# Patient Record
Sex: MALE | Race: OTHER | NOT HISPANIC OR LATINO | URBAN - METROPOLITAN AREA
[De-identification: names, ages, dates, MRNs, and addresses within clinical notes are randomized per-mention and may not be internally consistent; named-entity substitution may affect disease eponyms.]

---

## 2024-04-22 ENCOUNTER — INPATIENT (INPATIENT)
Facility: HOSPITAL | Age: 79
LOS: 13 days | Discharge: HOME HEALTH | DRG: 330 | End: 2024-05-06
Attending: SURGERY | Admitting: STUDENT IN AN ORGANIZED HEALTH CARE EDUCATION/TRAINING PROGRAM
Payer: SELF-PAY

## 2024-04-22 VITALS
RESPIRATION RATE: 18 BRPM | WEIGHT: 179.9 LBS | SYSTOLIC BLOOD PRESSURE: 181 MMHG | OXYGEN SATURATION: 98 % | DIASTOLIC BLOOD PRESSURE: 83 MMHG | TEMPERATURE: 98 F | HEART RATE: 81 BPM | HEIGHT: 64 IN

## 2024-04-22 DIAGNOSIS — K62.5 HEMORRHAGE OF ANUS AND RECTUM: ICD-10-CM

## 2024-04-22 LAB
ALBUMIN SERPL ELPH-MCNC: 4.3 G/DL — SIGNIFICANT CHANGE UP (ref 3.3–5)
ALP SERPL-CCNC: 78 U/L — SIGNIFICANT CHANGE UP (ref 40–120)
ALT FLD-CCNC: 34 U/L — SIGNIFICANT CHANGE UP (ref 10–45)
ANION GAP SERPL CALC-SCNC: 13 MMOL/L — SIGNIFICANT CHANGE UP (ref 5–17)
APPEARANCE UR: CLEAR — SIGNIFICANT CHANGE UP
APTT BLD: 29.7 SEC — SIGNIFICANT CHANGE UP (ref 24.5–35.6)
AST SERPL-CCNC: 50 U/L — HIGH (ref 10–40)
BASE EXCESS BLDV CALC-SCNC: -1.2 MMOL/L — SIGNIFICANT CHANGE UP (ref -2–3)
BASOPHILS # BLD AUTO: 0.03 K/UL — SIGNIFICANT CHANGE UP (ref 0–0.2)
BASOPHILS NFR BLD AUTO: 0.3 % — SIGNIFICANT CHANGE UP (ref 0–2)
BILIRUB SERPL-MCNC: 0.2 MG/DL — SIGNIFICANT CHANGE UP (ref 0.2–1.2)
BILIRUB UR-MCNC: NEGATIVE — SIGNIFICANT CHANGE UP
BUN SERPL-MCNC: 19 MG/DL — SIGNIFICANT CHANGE UP (ref 7–23)
CA-I SERPL-SCNC: 1.22 MMOL/L — SIGNIFICANT CHANGE UP (ref 1.15–1.33)
CALCIUM SERPL-MCNC: 9.3 MG/DL — SIGNIFICANT CHANGE UP (ref 8.4–10.5)
CHLORIDE BLDV-SCNC: 106 MMOL/L — SIGNIFICANT CHANGE UP (ref 96–108)
CHLORIDE SERPL-SCNC: 107 MMOL/L — SIGNIFICANT CHANGE UP (ref 96–108)
CO2 BLDV-SCNC: 26 MMOL/L — SIGNIFICANT CHANGE UP (ref 22–26)
CO2 SERPL-SCNC: 22 MMOL/L — SIGNIFICANT CHANGE UP (ref 22–31)
COLOR SPEC: YELLOW — SIGNIFICANT CHANGE UP
CREAT SERPL-MCNC: 0.87 MG/DL — SIGNIFICANT CHANGE UP (ref 0.5–1.3)
DIFF PNL FLD: NEGATIVE — SIGNIFICANT CHANGE UP
EGFR: 88 ML/MIN/1.73M2 — SIGNIFICANT CHANGE UP
EOSINOPHIL # BLD AUTO: 0.19 K/UL — SIGNIFICANT CHANGE UP (ref 0–0.5)
EOSINOPHIL NFR BLD AUTO: 2 % — SIGNIFICANT CHANGE UP (ref 0–6)
GAS PNL BLDV: 139 MMOL/L — SIGNIFICANT CHANGE UP (ref 136–145)
GAS PNL BLDV: SIGNIFICANT CHANGE UP
GLUCOSE BLDV-MCNC: 113 MG/DL — HIGH (ref 70–99)
GLUCOSE SERPL-MCNC: 102 MG/DL — HIGH (ref 70–99)
GLUCOSE UR QL: NEGATIVE MG/DL — SIGNIFICANT CHANGE UP
HCO3 BLDV-SCNC: 25 MMOL/L — SIGNIFICANT CHANGE UP (ref 22–29)
HCT VFR BLD CALC: 34.1 % — LOW (ref 39–50)
HCT VFR BLD CALC: 34.6 % — LOW (ref 39–50)
HCT VFR BLDA CALC: 32 % — LOW (ref 39–51)
HGB BLD CALC-MCNC: 10.8 G/DL — LOW (ref 12.6–17.4)
HGB BLD-MCNC: 10.6 G/DL — LOW (ref 13–17)
HGB BLD-MCNC: 10.7 G/DL — LOW (ref 13–17)
IMM GRANULOCYTES NFR BLD AUTO: 0.4 % — SIGNIFICANT CHANGE UP (ref 0–0.9)
INR BLD: 1.01 RATIO — SIGNIFICANT CHANGE UP (ref 0.85–1.18)
KETONES UR-MCNC: ABNORMAL MG/DL
LACTATE BLDV-MCNC: 1.2 MMOL/L — SIGNIFICANT CHANGE UP (ref 0.5–2)
LEUKOCYTE ESTERASE UR-ACNC: NEGATIVE — SIGNIFICANT CHANGE UP
LIDOCAIN IGE QN: 54 U/L — SIGNIFICANT CHANGE UP (ref 7–60)
LYMPHOCYTES # BLD AUTO: 1.8 K/UL — SIGNIFICANT CHANGE UP (ref 1–3.3)
LYMPHOCYTES # BLD AUTO: 19.2 % — SIGNIFICANT CHANGE UP (ref 13–44)
MAGNESIUM SERPL-MCNC: 2 MG/DL — SIGNIFICANT CHANGE UP (ref 1.6–2.6)
MCHC RBC-ENTMCNC: 26.2 PG — LOW (ref 27–34)
MCHC RBC-ENTMCNC: 26.4 PG — LOW (ref 27–34)
MCHC RBC-ENTMCNC: 30.9 GM/DL — LOW (ref 32–36)
MCHC RBC-ENTMCNC: 31.1 GM/DL — LOW (ref 32–36)
MCV RBC AUTO: 84.4 FL — SIGNIFICANT CHANGE UP (ref 80–100)
MCV RBC AUTO: 85.4 FL — SIGNIFICANT CHANGE UP (ref 80–100)
MONOCYTES # BLD AUTO: 0.72 K/UL — SIGNIFICANT CHANGE UP (ref 0–0.9)
MONOCYTES NFR BLD AUTO: 7.7 % — SIGNIFICANT CHANGE UP (ref 2–14)
NEUTROPHILS # BLD AUTO: 6.6 K/UL — SIGNIFICANT CHANGE UP (ref 1.8–7.4)
NEUTROPHILS NFR BLD AUTO: 70.4 % — SIGNIFICANT CHANGE UP (ref 43–77)
NITRITE UR-MCNC: NEGATIVE — SIGNIFICANT CHANGE UP
NRBC # BLD: 0 /100 WBCS — SIGNIFICANT CHANGE UP (ref 0–0)
NRBC # BLD: 0 /100 WBCS — SIGNIFICANT CHANGE UP (ref 0–0)
OB PNL STL: NEGATIVE — SIGNIFICANT CHANGE UP
PCO2 BLDV: 46 MMHG — SIGNIFICANT CHANGE UP (ref 42–55)
PH BLDV: 7.34 — SIGNIFICANT CHANGE UP (ref 7.32–7.43)
PH UR: 5 — SIGNIFICANT CHANGE UP (ref 5–8)
PLATELET # BLD AUTO: 283 K/UL — SIGNIFICANT CHANGE UP (ref 150–400)
PLATELET # BLD AUTO: 298 K/UL — SIGNIFICANT CHANGE UP (ref 150–400)
PO2 BLDV: 19 MMHG — LOW (ref 25–45)
POTASSIUM BLDV-SCNC: 4.1 MMOL/L — SIGNIFICANT CHANGE UP (ref 3.5–5.1)
POTASSIUM SERPL-MCNC: 4.2 MMOL/L — SIGNIFICANT CHANGE UP (ref 3.5–5.3)
POTASSIUM SERPL-SCNC: 4.2 MMOL/L — SIGNIFICANT CHANGE UP (ref 3.5–5.3)
PROT SERPL-MCNC: 7.5 G/DL — SIGNIFICANT CHANGE UP (ref 6–8.3)
PROT UR-MCNC: NEGATIVE MG/DL — SIGNIFICANT CHANGE UP
PROTHROM AB SERPL-ACNC: 10.6 SEC — SIGNIFICANT CHANGE UP (ref 9.5–13)
RBC # BLD: 4.04 M/UL — LOW (ref 4.2–5.8)
RBC # BLD: 4.05 M/UL — LOW (ref 4.2–5.8)
RBC # FLD: 13.8 % — SIGNIFICANT CHANGE UP (ref 10.3–14.5)
RBC # FLD: 13.9 % — SIGNIFICANT CHANGE UP (ref 10.3–14.5)
SAO2 % BLDV: 24 % — LOW (ref 67–88)
SODIUM SERPL-SCNC: 142 MMOL/L — SIGNIFICANT CHANGE UP (ref 135–145)
SP GR SPEC: 1.02 — SIGNIFICANT CHANGE UP (ref 1–1.03)
UROBILINOGEN FLD QL: 0.2 MG/DL — SIGNIFICANT CHANGE UP (ref 0.2–1)
WBC # BLD: 9.38 K/UL — SIGNIFICANT CHANGE UP (ref 3.8–10.5)
WBC # BLD: 9.82 K/UL — SIGNIFICANT CHANGE UP (ref 3.8–10.5)
WBC # FLD AUTO: 9.38 K/UL — SIGNIFICANT CHANGE UP (ref 3.8–10.5)
WBC # FLD AUTO: 9.82 K/UL — SIGNIFICANT CHANGE UP (ref 3.8–10.5)

## 2024-04-22 PROCEDURE — 99285 EMERGENCY DEPT VISIT HI MDM: CPT

## 2024-04-22 PROCEDURE — 74177 CT ABD & PELVIS W/CONTRAST: CPT | Mod: 26,MC

## 2024-04-22 PROCEDURE — 71045 X-RAY EXAM CHEST 1 VIEW: CPT | Mod: 26

## 2024-04-22 PROCEDURE — 99223 1ST HOSP IP/OBS HIGH 75: CPT

## 2024-04-22 NOTE — ED PROVIDER NOTE - CONSIDERATION OF ADMISSION OBSERVATION
Patient with no out patient follow up. Recent moved to this area making discharge less safe Consideration of Admission/Observation

## 2024-04-22 NOTE — ED PROVIDER NOTE - OBJECTIVE STATEMENT
79-year-old male with a history of prostate cancer, HTN, T2DM presenting with painless BRBPR.  Patient reports he had significant blood in the toilet earlier tonight and presented to the ED for further evaluation.  Per his niece at bedside he is visiting from [place] at this time and all of his medical care has been in [place].  Per patient's daughter he had a colonoscopy in [place] several months ago that was concerning for a possible carcinoma of his colon however was unable to get follow-up.  He otherwise has no significant headache, chest pain, shortness of breath, and/V/D/abdominal pain, dysuria, peripheral edema, fever/chills.

## 2024-04-22 NOTE — ED PROVIDER NOTE - PROGRESS NOTE DETAILS
Initial hemoglobin 10.6 with unknown baseline.  CT abdomen pelvis with evidence of short segment mucosal thickening with enhancement at the distal sigmoid colon concerning for malignancy.  Admit to medicine for colonoscopy and further management of LGIB. GI email sent

## 2024-04-22 NOTE — ED PROVIDER NOTE - DIFFERENTIAL DIAGNOSIS
hemmorhoid, gi bleeding, diverticulosis, radiation proctitis, gladys rectal abcess. Differential Diagnosis

## 2024-04-22 NOTE — ED ADULT NURSE REASSESSMENT NOTE - NS ED NURSE REASSESS COMMENT FT1
MD Clay stated it was okay for pt to eat. Pt given vegetable per diet restrictions and water. Niece at bedside. Safety and comfort measure maintained. Pt denies needs at this time.

## 2024-04-22 NOTE — ED PROVIDER NOTE - CLINICAL SUMMARY MEDICAL DECISION MAKING FREE TEXT BOX
79-year-old male with a history of prostate cancer, HTN, T2DM presenting with painless BRBPR. Concern for hemorrhoidal versus internal hemorrhoid versus diverticular versus colonic mass bleeding.  Labs, EKG, CT A/P, UA/urine culture ordered. 79-year-old male with a history of prostate cancer, HTN, T2DM presenting with painless BRBPR. Concern for hemorrhoidal versus internal hemorrhoid versus diverticular versus colonic mass bleeding.  Labs, EKG, CT A/P, UA/urine culture ordered.    Randall: 80 yo with multiple medical problems including prostate cancer which he got radiation and chemo for. Patient with hx of endoscopy with suspicious lesion in his country. Patient high risk for cancer. Lab studies ordered, independently reviewed and acted on as appropriate. Not unstable at this time. will follow hct.

## 2024-04-22 NOTE — H&P ADULT - NSHPPHYSICALEXAM_GEN_ALL_CORE
Vital Signs Last 24 Hrs  T(C): 36.9 (23 Apr 2024 01:06), Max: 36.9 (22 Apr 2024 19:40)  T(F): 98.4 (23 Apr 2024 01:06), Max: 98.4 (22 Apr 2024 19:40)  HR: 56 (23 Apr 2024 01:06) (56 - 81)  BP: 137/71 (23 Apr 2024 01:06) (131/77 - 181/83)  BP(mean): 93 (23 Apr 2024 01:06) (93 - 114)  RR: 15 (23 Apr 2024 01:06) (15 - 18)  SpO2: 98% (23 Apr 2024 01:06) (96% - 98%)    Parameters below as of 23 Apr 2024 01:06  Patient On (Oxygen Delivery Method): room air        CONSTITUTIONAL: Well-groomed, in no apparent distress  EYES: No conjunctival or scleral injection, non-icteric  ENMT: No external nasal lesions; MMM  RESPIRATORY: Breathing comfortably; lungs CTA without wheeze/rhonchi/rales  CARDIOVASCULAR: +S1S2, RRR; no lower extremity edema  GASTROINTESTINAL: vague discomfort on palpation diffusedly , +BS throughout, no rebound/guarding  NEUROLOGIC: No gross focal neurological deficits, AAOX3  PSYCHIATRIC: mood and affect appropriate; appropriate insight and judgment

## 2024-04-22 NOTE — ED ADULT NURSE NOTE - OBJECTIVE STATEMENT
Pt is a 78 y/o M admit to ED for abd pain and rectal bleeding x3 days. Pt is visiting from [place] arrived Friday night. Pt states that the rectal bleeding is "sometime its bright red, and other time its dark". Pt endorses that he took Tylenol this morning with minimal relief of pain. Pt denies chest pain, SOB, HA, n/v/d. PMH of diabetes type II, prostate CA (chemo injection two weeks ago), glaucoma. A&O x3, VS see flowsheet. 18g RT AC inserted. Pt denies blood thinners, LOC, or recent trauma. Niece at bedside. Safety & comfort measures initiated. Pt denies needs at this time.

## 2024-04-22 NOTE — H&P ADULT - HISTORY OF PRESENT ILLNESS
79M PMHx HTN, T2DM, h/o prostate ca s/p RT. He's currently visiting from [place] and came to ED for BRBPR [id number] in the past few days. He reports vague abd discomfort.  Denies fever, chills, cp, sob, cough, palpitation, n/v/d.    In ED, HR wnl, RR wnl, on RA. Intermittently hypertensive in the ED but spontaneously improving.    CBC w/ hgb 10.6 with unknown baseline, plt wnl. Repeat CBC in 4hrs with hgb 10.7   Coag wnl.    CMP unremarkable except for AST 50 and ALT 34.    VBG w/ lactate 1.2.    UA unremarkable. CXR clear. CT a/p concerning for malignancy in distal sigmoid colon.    ED emailed GI.      *Of note, ED reported he had c-scope few months ago that was concerning for malignancy. On my interview, he denied ever having colonoscopy and was surprised to hear that today's CT result is concerning for malignancy.

## 2024-04-22 NOTE — ED PROVIDER NOTE - PHYSICAL EXAMINATION
GENERAL: NAD  HEAD: normocephalic, atraumatic  HEENT: normal conjunctiva, oral mucosa moist, uvula midline, neck supple  CARDIAC: regular rate and rhythm, normal S1S2, no appreciable murmurs  PULM: speaking in full sentences, normal breath sounds, clear to ascultation bilaterally, no rales, rhonchi, wheezing  GI: abdomen nondistended, soft, nontender  : no CVA tenderness b/l, no suprapubic tenderness, Rectal exam without significant tenderness, active bleeding, external hemorrhoids or evidence of anal fissure  NEURO: moving all 4 extremities, no focal deficits, normal speech, AOx3  MSK: no peripheral edema, no calf tenderness b/l  SKIN: well-perfused, extremities warm  PSYCH: appropriate mood and affect

## 2024-04-22 NOTE — ED PROVIDER NOTE - NSICDXPASTMEDICALHX_GEN_ALL_CORE_FT
PAST MEDICAL HISTORY:  Diabetes mellitus     Hyperlipidemia     Hypertension     Prostate cancer

## 2024-04-22 NOTE — ED PROVIDER NOTE - ATTENDING CONTRIBUTION TO CARE
I performed a history and physical exam of the patient and discussed their management with the resident and /or advanced care provider. I reviewed the resident and /or ACP's note and agree with the documented findings and plan of care. My medical decision making and observations are found above.  Lungs clear, abd soft, no external Hemmorrhoids.

## 2024-04-22 NOTE — ED PROVIDER NOTE - RAPID ASSESSMENT
79-year-old male with history of hypertension, diabetes and prior prostate cancer status post radiation therapy presents the emergency department complaining of abdominal pain and rectal bleeding.  Patient reports that he had both episodes of bright red blood per rectum as well as dark stool.  He also endorses that he has had intermittent abdominal cramping which is at times severe in nature.  He feels that he is generally fatigued and more weak than his baseline.  Patient has never had a colonoscopy.    Rapid assessment by Radha Aguilar PA-C full eval to be performed in ED

## 2024-04-23 DIAGNOSIS — I10 ESSENTIAL (PRIMARY) HYPERTENSION: ICD-10-CM

## 2024-04-23 DIAGNOSIS — Z29.9 ENCOUNTER FOR PROPHYLACTIC MEASURES, UNSPECIFIED: ICD-10-CM

## 2024-04-23 DIAGNOSIS — K62.5 HEMORRHAGE OF ANUS AND RECTUM: ICD-10-CM

## 2024-04-23 DIAGNOSIS — E11.9 TYPE 2 DIABETES MELLITUS WITHOUT COMPLICATIONS: ICD-10-CM

## 2024-04-23 DIAGNOSIS — R93.89 ABNORMAL FINDINGS ON DIAGNOSTIC IMAGING OF OTHER SPECIFIED BODY STRUCTURES: ICD-10-CM

## 2024-04-23 DIAGNOSIS — C18.9 MALIGNANT NEOPLASM OF COLON, UNSPECIFIED: ICD-10-CM

## 2024-04-23 LAB
A1C WITH ESTIMATED AVERAGE GLUCOSE RESULT: 6.5 % — HIGH (ref 4–5.6)
ANION GAP SERPL CALC-SCNC: 12 MMOL/L — SIGNIFICANT CHANGE UP (ref 5–17)
BUN SERPL-MCNC: 16 MG/DL — SIGNIFICANT CHANGE UP (ref 7–23)
CALCIUM SERPL-MCNC: 9.2 MG/DL — SIGNIFICANT CHANGE UP (ref 8.4–10.5)
CHLORIDE SERPL-SCNC: 105 MMOL/L — SIGNIFICANT CHANGE UP (ref 96–108)
CO2 SERPL-SCNC: 24 MMOL/L — SIGNIFICANT CHANGE UP (ref 22–31)
CREAT SERPL-MCNC: 0.85 MG/DL — SIGNIFICANT CHANGE UP (ref 0.5–1.3)
CULTURE RESULTS: SIGNIFICANT CHANGE UP
EGFR: 88 ML/MIN/1.73M2 — SIGNIFICANT CHANGE UP
ESTIMATED AVERAGE GLUCOSE: 140 MG/DL — HIGH (ref 68–114)
GLUCOSE BLDC GLUCOMTR-MCNC: 116 MG/DL — HIGH (ref 70–99)
GLUCOSE BLDC GLUCOMTR-MCNC: 117 MG/DL — HIGH (ref 70–99)
GLUCOSE BLDC GLUCOMTR-MCNC: 119 MG/DL — HIGH (ref 70–99)
GLUCOSE BLDC GLUCOMTR-MCNC: 140 MG/DL — HIGH (ref 70–99)
GLUCOSE SERPL-MCNC: 104 MG/DL — HIGH (ref 70–99)
HCT VFR BLD CALC: 31 % — LOW (ref 39–50)
HCV AB S/CO SERPL IA: 0.12 S/CO — SIGNIFICANT CHANGE UP (ref 0–0.99)
HCV AB SERPL-IMP: SIGNIFICANT CHANGE UP
HGB BLD-MCNC: 9.6 G/DL — LOW (ref 13–17)
MAGNESIUM SERPL-MCNC: 2 MG/DL — SIGNIFICANT CHANGE UP (ref 1.6–2.6)
MCHC RBC-ENTMCNC: 26.3 PG — LOW (ref 27–34)
MCHC RBC-ENTMCNC: 31 GM/DL — LOW (ref 32–36)
MCV RBC AUTO: 84.9 FL — SIGNIFICANT CHANGE UP (ref 80–100)
NRBC # BLD: 0 /100 WBCS — SIGNIFICANT CHANGE UP (ref 0–0)
PHOSPHATE SERPL-MCNC: 3.5 MG/DL — SIGNIFICANT CHANGE UP (ref 2.5–4.5)
PLATELET # BLD AUTO: 246 K/UL — SIGNIFICANT CHANGE UP (ref 150–400)
POTASSIUM SERPL-MCNC: 3.9 MMOL/L — SIGNIFICANT CHANGE UP (ref 3.5–5.3)
POTASSIUM SERPL-SCNC: 3.9 MMOL/L — SIGNIFICANT CHANGE UP (ref 3.5–5.3)
RBC # BLD: 3.65 M/UL — LOW (ref 4.2–5.8)
RBC # FLD: 13.8 % — SIGNIFICANT CHANGE UP (ref 10.3–14.5)
SODIUM SERPL-SCNC: 141 MMOL/L — SIGNIFICANT CHANGE UP (ref 135–145)
SPECIMEN SOURCE: SIGNIFICANT CHANGE UP
WBC # BLD: 7.38 K/UL — SIGNIFICANT CHANGE UP (ref 3.8–10.5)
WBC # FLD AUTO: 7.38 K/UL — SIGNIFICANT CHANGE UP (ref 3.8–10.5)

## 2024-04-23 PROCEDURE — 99223 1ST HOSP IP/OBS HIGH 75: CPT | Mod: GC

## 2024-04-23 PROCEDURE — 99232 SBSQ HOSP IP/OBS MODERATE 35: CPT

## 2024-04-23 RX ORDER — ONDANSETRON 8 MG/1
4 TABLET, FILM COATED ORAL EVERY 8 HOURS
Refills: 0 | Status: DISCONTINUED | OUTPATIENT
Start: 2024-04-23 | End: 2024-05-01

## 2024-04-23 RX ORDER — INSULIN LISPRO 100/ML
VIAL (ML) SUBCUTANEOUS AT BEDTIME
Refills: 0 | Status: DISCONTINUED | OUTPATIENT
Start: 2024-04-23 | End: 2024-04-24

## 2024-04-23 RX ORDER — SODIUM CHLORIDE 9 MG/ML
1000 INJECTION, SOLUTION INTRAVENOUS
Refills: 0 | Status: DISCONTINUED | OUTPATIENT
Start: 2024-04-23 | End: 2024-04-24

## 2024-04-23 RX ORDER — LANOLIN ALCOHOL/MO/W.PET/CERES
3 CREAM (GRAM) TOPICAL AT BEDTIME
Refills: 0 | Status: DISCONTINUED | OUTPATIENT
Start: 2024-04-23 | End: 2024-05-01

## 2024-04-23 RX ORDER — METFORMIN HYDROCHLORIDE 850 MG/1
0 TABLET ORAL
Refills: 0 | DISCHARGE

## 2024-04-23 RX ORDER — DEXTROSE 10 % IN WATER 10 %
125 INTRAVENOUS SOLUTION INTRAVENOUS ONCE
Refills: 0 | Status: DISCONTINUED | OUTPATIENT
Start: 2024-04-23 | End: 2024-04-24

## 2024-04-23 RX ORDER — INSULIN LISPRO 100/ML
VIAL (ML) SUBCUTANEOUS
Refills: 0 | Status: DISCONTINUED | OUTPATIENT
Start: 2024-04-23 | End: 2024-04-24

## 2024-04-23 RX ORDER — DEXTROSE 50 % IN WATER 50 %
12.5 SYRINGE (ML) INTRAVENOUS ONCE
Refills: 0 | Status: DISCONTINUED | OUTPATIENT
Start: 2024-04-23 | End: 2024-04-24

## 2024-04-23 RX ORDER — SOD SULF/SODIUM/NAHCO3/KCL/PEG
4000 SOLUTION, RECONSTITUTED, ORAL ORAL ONCE
Refills: 0 | Status: COMPLETED | OUTPATIENT
Start: 2024-04-23 | End: 2024-04-23

## 2024-04-23 RX ORDER — HEPARIN SODIUM 5000 [USP'U]/ML
5000 INJECTION INTRAVENOUS; SUBCUTANEOUS EVERY 8 HOURS
Refills: 0 | Status: DISCONTINUED | OUTPATIENT
Start: 2024-04-23 | End: 2024-04-23

## 2024-04-23 RX ORDER — GLUCAGON INJECTION, SOLUTION 0.5 MG/.1ML
1 INJECTION, SOLUTION SUBCUTANEOUS ONCE
Refills: 0 | Status: DISCONTINUED | OUTPATIENT
Start: 2024-04-23 | End: 2024-04-24

## 2024-04-23 RX ORDER — DEXTROSE 50 % IN WATER 50 %
15 SYRINGE (ML) INTRAVENOUS ONCE
Refills: 0 | Status: DISCONTINUED | OUTPATIENT
Start: 2024-04-23 | End: 2024-04-24

## 2024-04-23 RX ORDER — ACETAMINOPHEN 500 MG
650 TABLET ORAL EVERY 6 HOURS
Refills: 0 | Status: DISCONTINUED | OUTPATIENT
Start: 2024-04-23 | End: 2024-05-01

## 2024-04-23 RX ORDER — AMLODIPINE BESYLATE 2.5 MG/1
5 TABLET ORAL DAILY
Refills: 0 | Status: DISCONTINUED | OUTPATIENT
Start: 2024-04-23 | End: 2024-05-01

## 2024-04-23 RX ORDER — DEXTROSE 50 % IN WATER 50 %
25 SYRINGE (ML) INTRAVENOUS ONCE
Refills: 0 | Status: DISCONTINUED | OUTPATIENT
Start: 2024-04-23 | End: 2024-04-24

## 2024-04-23 RX ADMIN — Medication 4000 MILLILITER(S): at 18:45

## 2024-04-23 RX ADMIN — AMLODIPINE BESYLATE 5 MILLIGRAM(S): 2.5 TABLET ORAL at 08:56

## 2024-04-23 NOTE — PROGRESS NOTE ADULT - SUBJECTIVE AND OBJECTIVE BOX
Bryon Dodge MD  Division of Hospital Medicine  Available via MS teams  ---------------------------------------------------------    NEHEMIAS GARCIA  79y  Male      Patient is a 79y old  Male who presents with a chief complaint of     INTERVAL HPI/OVERNIGHT EVENTS:  Seen at bedside. no overnight events.       REVIEW OF SYSTEMS: 10 point ROS negative unless listed above    T(C): 37.1 (04-23-24 @ 04:53), Max: 37.1 (04-23-24 @ 04:53)  HR: 69 (04-23-24 @ 08:55) (56 - 81)  BP: 151/73 (04-23-24 @ 08:55) (101/57 - 181/83)  RR: 18 (04-23-24 @ 04:53) (15 - 18)  SpO2: 98% (04-23-24 @ 04:53) (96% - 98%)  Wt(kg): --Vital Signs Last 24 Hrs  T(C): 37.1 (23 Apr 2024 04:53), Max: 37.1 (23 Apr 2024 04:53)  T(F): 98.8 (23 Apr 2024 04:53), Max: 98.8 (23 Apr 2024 04:53)  HR: 69 (23 Apr 2024 08:55) (56 - 81)  BP: 151/73 (23 Apr 2024 08:55) (101/57 - 181/83)  BP(mean): 93 (23 Apr 2024 01:06) (93 - 114)  RR: 18 (23 Apr 2024 04:53) (15 - 18)  SpO2: 98% (23 Apr 2024 04:53) (96% - 98%)    Parameters below as of 23 Apr 2024 04:53  Patient On (Oxygen Delivery Method): room air        PHYSICAL EXAM:  GENERAL: NAD, well-groomed, well-developed  NECK: Supple, No JVD  CHEST/LUNG: Clear to auscultation bilaterally; No rales, rhonchi, wheezing, or rubs  HEART: Regular rate and rhythm; No murmurs, rubs, or gallops  ABDOMEN: Soft, Nontender, Nondistended; Bowel sounds present.    EXTREMITIES:  2+ Peripheral Pulses, No clubbing, cyanosis, or edema  PSYCH: Alert & Oriented x3          LABS:                        9.6    7.38  )-----------( 246      ( 23 Apr 2024 06:48 )             31.0     04-23    141  |  105  |  16  ----------------------------<  104<H>  3.9   |  24  |  0.85    Ca    9.2      23 Apr 2024 06:48  Phos  3.5     04-23  Mg     2.0     04-23    TPro  7.5  /  Alb  4.3  /  TBili  0.2  /  DBili  x   /  AST  50<H>  /  ALT  34  /  AlkPhos  78  04-22    PT/INR - ( 22 Apr 2024 18:07 )   PT: 10.6 sec;   INR: 1.01 ratio         PTT - ( 22 Apr 2024 18:07 )  PTT:29.7 sec  Urinalysis Basic - ( 23 Apr 2024 06:48 )    Color: x / Appearance: x / SG: x / pH: x  Gluc: 104 mg/dL / Ketone: x  / Bili: x / Urobili: x   Blood: x / Protein: x / Nitrite: x   Leuk Esterase: x / RBC: x / WBC x   Sq Epi: x / Non Sq Epi: x / Bacteria: x      CAPILLARY BLOOD GLUCOSE      POCT Blood Glucose.: 117 mg/dL (23 Apr 2024 07:15)        Urinalysis Basic - ( 23 Apr 2024 06:48 )    Color: x / Appearance: x / SG: x / pH: x  Gluc: 104 mg/dL / Ketone: x  / Bili: x / Urobili: x   Blood: x / Protein: x / Nitrite: x   Leuk Esterase: x / RBC: x / WBC x   Sq Epi: x / Non Sq Epi: x / Bacteria: x        RADIOLOGY & ADDITIONAL TESTS:    Imaging Personally Reviewed:  [ ] YES  [ ] NO

## 2024-04-23 NOTE — H&P ADULT - NSHPLABSRESULTS_GEN_ALL_CORE
10.7   9.82  )-----------( 298      ( 2024 22:41 )             34.6           142  |  107  |  19  ----------------------------<  102<H>  4.2   |  22  |  0.87    Ca    9.3      2024 18:07  Mg     2.0         TPro  7.5  /  Alb  4.3  /  TBili  0.2  /  DBili  x   /  AST  50<H>  /  ALT  34  /  AlkPhos  78                Urinalysis Basic - ( 2024 19:56 )    Color: Yellow / Appearance: Clear / S.019 / pH: x  Gluc: x / Ketone: Trace mg/dL  / Bili: Negative / Urobili: 0.2 mg/dL   Blood: x / Protein: Negative mg/dL / Nitrite: Negative   Leuk Esterase: Negative / RBC: x / WBC x   Sq Epi: x / Non Sq Epi: x / Bacteria: x        PT/INR - ( 2024 18:07 )   PT: 10.6 sec;   INR: 1.01 ratio         PTT - ( 2024 18:07 )  PTT:29.7 sec          CAPILLARY BLOOD GLUCOSE
10.7   9.82  )-----------( 298      ( 2024 22:41 )             34.6           142  |  107  |  19  ----------------------------<  102<H>  4.2   |  22  |  0.87    Ca    9.3      2024 18:07  Mg     2.0         TPro  7.5  /  Alb  4.3  /  TBili  0.2  /  DBili  x   /  AST  50<H>  /  ALT  34  /  AlkPhos  78                Urinalysis Basic - ( 2024 19:56 )    Color: Yellow / Appearance: Clear / S.019 / pH: x  Gluc: x / Ketone: Trace mg/dL  / Bili: Negative / Urobili: 0.2 mg/dL   Blood: x / Protein: Negative mg/dL / Nitrite: Negative   Leuk Esterase: Negative / RBC: x / WBC x   Sq Epi: x / Non Sq Epi: x / Bacteria: x        PT/INR - ( 2024 18:07 )   PT: 10.6 sec;   INR: 1.01 ratio         PTT - ( 2024 18:07 )  PTT:29.7 sec          CAPILLARY BLOOD GLUCOSE

## 2024-04-23 NOTE — CONSULT NOTE ADULT - ASSESSMENT
79year old male with HTN, T2DM, h/o prostate ca s/p RT. He's currently visiting from [place] and came to ED for BRBPR x3 times in the past few days.     Assessment  #Hematochezia  #Prostate CA s/p RT  #Abnormal CT findings  - DDx: Radiation proctitis vs. colonic malignancy vs. ulcer vs. AVM's vs. diverticular bleed  - Hemodynamically stable; H&H drop, not requiring transfusion  - No report of cardiac diseases or A/C use  - No prior colonoscopy    Recommendations  - Clear liquids today  - Keep NPO after midnight for tentative colonoscopy tomorrow  - Will order bowel prep  - Trend H&H and transfuse for goal hgb > 7    Note incomplete until finalized by attending signature/attestation.    Verito Osei  GI/Hepatology Fellow    MONDAY-FRIDAY 8AM-5PM:  Pager# [phone] ([facility]) or [phone] ([facility])    NON-URGENT CONSULTS:  Please email [email or web address] OR [email or web address]  AT NIGHT AND ON WEEKENDS:  Contact on-call GI fellow via AMION by paging or hospital  from 5pm-8am and on weekends/holidays

## 2024-04-23 NOTE — PROGRESS NOTE ADULT - PROBLEM SELECTOR PLAN 1
CT with thickening in sigmoid colon. DDx: Radiation proctitis vs. colonic malignancy vs. ulcer vs. AVM's   - No prior colonoscopy as per discussion with patient  - Clear liquids today  - NPO after midnight for tentative colonoscopy tomorrow  - bowel prep  - Trend H&H and transfuse for goal hgb > 7

## 2024-04-23 NOTE — H&P ADULT - HISTORY OF PRESENT ILLNESS
79M PMHx HTN, T2DM, h/o prostate ca s/p RT and recent c-scope at [place] several months ago that was concerning for malignancy but didn't follow up. He's currently visiting from [place] and came to ED for painless BRBPR. Denies fever, chills, cp, sob, cough, palpitation, abd pain, n/v/d.   In ED, HR wnl, RR wnl, on RA. Intermittently hypertensive in the ED but spontaneously improving.   CBC w/ hgb 10.6 with unknown baseline, plt wnl. Repeat CBC in 4hrs with hgb 10.7  Coag wnl.   CMP unremarkable except for AST 50 and ALT 34.   VBG w/ lactate 1.2.   UA unremarkable. CXR clear. CT a/p concerning for malignancy in distal sigmoid colon.   ED emailed GI.  79M PMHx HTN, T2DM, h/o prostate ca s/p RT. He's currently visiting from [place] and came to ED for BRBPR [id number] in the past few days. He reports vague abd discomfort.  Denies fever, chills, cp, sob, cough, palpitation, n/v/d.   In ED, HR wnl, RR wnl, on RA. Intermittently hypertensive in the ED but spontaneously improving.   CBC w/ hgb 10.6 with unknown baseline, plt wnl. Repeat CBC in 4hrs with hgb 10.7  Coag wnl.   CMP unremarkable except for AST 50 and ALT 34.   VBG w/ lactate 1.2.   UA unremarkable. CXR clear. CT a/p concerning for malignancy in distal sigmoid colon.   ED emailed GI.     *Of note, ED reported he had c-scope few months ago that was concerning for malignancy. On my interview, he denied ever having colonoscopy and was surprised to hear that today's CT result is concerning for malignancy.

## 2024-04-23 NOTE — H&P ADULT - PROBLEM SELECTOR PLAN 3
-on perindopril and amlodipine combo pill, unknown dose.    -hypertensive here.    -will start amlodipine 5mg. Adjust regimen as appropriate.
-on perindopril and amlodipine combo pill, unknown dose.   -hypertensive here.   -will start amlodipine 5mg. Adjust regimen as appropriate

## 2024-04-23 NOTE — H&P ADULT - PROBLEM SELECTOR PLAN 4
-on metformin, unknown dose at home.    -start ISS while inpatient.
-on metformin, unknown dose at home.   -star ISS while inpatient

## 2024-04-23 NOTE — H&P ADULT - PROBLEM SELECTOR PLAN 1
-had c-scope months ago that was concering for malignancy but didn't follow up in [place].   -GI emailed. f/u consult.
-questionable ? had c-scope months ago that was concerning for malignancy but didn't follow up in [place]. Unclear to me where this history came from.   -GI emailed. f/u consult. Will likely need colonoscopy this admission.

## 2024-04-23 NOTE — H&P ADULT - PROBLEM SELECTOR PLAN 5
VTE ppx: scd for now. If hgb remain stable, consider chemical ppx      GI ppx: n/i.
VTE ppx: scd for now. If hgb remain stable, consider chemical ppx   GI ppx: n/i

## 2024-04-23 NOTE — H&P ADULT - NSHPADDITIONALINFOADULT_GEN_ALL_CORE
Fluid: po  Nutrition: dm/dash    Activity: as tolerated     CODE STATUS: full code     Med rec:   Done with patient verbally.
Fluid: po   Nutrition: dm/dash     Activity: as tolerated      CODE STATUS: full code        Med rec:    Done with patient verbally.

## 2024-04-23 NOTE — H&P ADULT - TIME BILLING
Time-based billing (NON-critical care).   The necessity of the time spent during the encounter on this date of service was due to:     - Ordering, reviewing, and interpreting labs, testing, and imaging.  - Independently obtaining a review of systems and performing a physical exam  - Reviewing prior hospitalization and where necessary, outpatient records.  - Counselling and educating patient and family regarding interpretation of aforementioned items and plan of care.
Time-based billing (NON-critical care).   The necessity of the time spent during the encounter on this date of service was due to:     - Ordering, reviewing, and interpreting labs, testing, and imaging.  - Independently obtaining a review of systems and performing a physical exam  - Reviewing prior hospitalization and where necessary, outpatient records.  - Counselling and educating patient and family regarding interpretation of aforementioned items and plan of care.

## 2024-04-23 NOTE — H&P ADULT - NSVTERISKASSESS_GEN_ALL_CORE FT
Medical Assessment Completed on: 23-Apr-2024 00:16
Medical Assessment Completed on: 23-Apr-2024 01:12

## 2024-04-24 ENCOUNTER — RESULT REVIEW (OUTPATIENT)
Age: 79
End: 2024-04-24

## 2024-04-24 LAB
ANION GAP SERPL CALC-SCNC: 11 MMOL/L — SIGNIFICANT CHANGE UP (ref 5–17)
APTT BLD: 28.4 SEC — SIGNIFICANT CHANGE UP (ref 24.5–35.6)
BUN SERPL-MCNC: 11 MG/DL — SIGNIFICANT CHANGE UP (ref 7–23)
CALCIUM SERPL-MCNC: 9.1 MG/DL — SIGNIFICANT CHANGE UP (ref 8.4–10.5)
CHLORIDE SERPL-SCNC: 101 MMOL/L — SIGNIFICANT CHANGE UP (ref 96–108)
CO2 SERPL-SCNC: 25 MMOL/L — SIGNIFICANT CHANGE UP (ref 22–31)
CREAT SERPL-MCNC: 0.88 MG/DL — SIGNIFICANT CHANGE UP (ref 0.5–1.3)
EGFR: 87 ML/MIN/1.73M2 — SIGNIFICANT CHANGE UP
GLUCOSE BLDC GLUCOMTR-MCNC: 119 MG/DL — HIGH (ref 70–99)
GLUCOSE BLDC GLUCOMTR-MCNC: 125 MG/DL — HIGH (ref 70–99)
GLUCOSE BLDC GLUCOMTR-MCNC: 133 MG/DL — HIGH (ref 70–99)
GLUCOSE BLDC GLUCOMTR-MCNC: 139 MG/DL — HIGH (ref 70–99)
GLUCOSE SERPL-MCNC: 112 MG/DL — HIGH (ref 70–99)
HCT VFR BLD CALC: 30.8 % — LOW (ref 39–50)
HGB BLD-MCNC: 9.8 G/DL — LOW (ref 13–17)
INR BLD: 1.07 RATIO — SIGNIFICANT CHANGE UP (ref 0.85–1.18)
MCHC RBC-ENTMCNC: 26.3 PG — LOW (ref 27–34)
MCHC RBC-ENTMCNC: 31.8 GM/DL — LOW (ref 32–36)
MCV RBC AUTO: 82.6 FL — SIGNIFICANT CHANGE UP (ref 80–100)
NRBC # BLD: 0 /100 WBCS — SIGNIFICANT CHANGE UP (ref 0–0)
PLATELET # BLD AUTO: 252 K/UL — SIGNIFICANT CHANGE UP (ref 150–400)
POTASSIUM SERPL-MCNC: 4.2 MMOL/L — SIGNIFICANT CHANGE UP (ref 3.5–5.3)
POTASSIUM SERPL-SCNC: 4.2 MMOL/L — SIGNIFICANT CHANGE UP (ref 3.5–5.3)
PROTHROM AB SERPL-ACNC: 11.7 SEC — SIGNIFICANT CHANGE UP (ref 9.5–13)
RBC # BLD: 3.73 M/UL — LOW (ref 4.2–5.8)
RBC # FLD: 13.5 % — SIGNIFICANT CHANGE UP (ref 10.3–14.5)
SODIUM SERPL-SCNC: 137 MMOL/L — SIGNIFICANT CHANGE UP (ref 135–145)
WBC # BLD: 6.62 K/UL — SIGNIFICANT CHANGE UP (ref 3.8–10.5)
WBC # FLD AUTO: 6.62 K/UL — SIGNIFICANT CHANGE UP (ref 3.8–10.5)

## 2024-04-24 PROCEDURE — 99232 SBSQ HOSP IP/OBS MODERATE 35: CPT

## 2024-04-24 PROCEDURE — 88341 IMHCHEM/IMCYTCHM EA ADD ANTB: CPT | Mod: 26

## 2024-04-24 PROCEDURE — 88305 TISSUE EXAM BY PATHOLOGIST: CPT | Mod: 26

## 2024-04-24 PROCEDURE — 88342 IMHCHEM/IMCYTCHM 1ST ANTB: CPT | Mod: 26

## 2024-04-24 PROCEDURE — 45381 COLONOSCOPY SUBMUCOUS NJX: CPT | Mod: GC

## 2024-04-24 PROCEDURE — 45380 COLONOSCOPY AND BIOPSY: CPT | Mod: GC

## 2024-04-24 DEVICE — NET RETRV ROT ROTH 2.5MMX230CM: Type: IMPLANTABLE DEVICE | Status: FUNCTIONAL

## 2024-04-24 RX ORDER — SODIUM CHLORIDE 9 MG/ML
1000 INJECTION, SOLUTION INTRAVENOUS
Refills: 0 | Status: DISCONTINUED | OUTPATIENT
Start: 2024-04-24 | End: 2024-05-01

## 2024-04-24 RX ORDER — INSULIN LISPRO 100/ML
VIAL (ML) SUBCUTANEOUS
Refills: 0 | Status: DISCONTINUED | OUTPATIENT
Start: 2024-04-24 | End: 2024-04-24

## 2024-04-24 RX ORDER — GLUCAGON INJECTION, SOLUTION 0.5 MG/.1ML
1 INJECTION, SOLUTION SUBCUTANEOUS ONCE
Refills: 0 | Status: DISCONTINUED | OUTPATIENT
Start: 2024-04-24 | End: 2024-05-01

## 2024-04-24 RX ORDER — INSULIN LISPRO 100/ML
VIAL (ML) SUBCUTANEOUS AT BEDTIME
Refills: 0 | Status: DISCONTINUED | OUTPATIENT
Start: 2024-04-24 | End: 2024-05-01

## 2024-04-24 RX ORDER — DEXTROSE 50 % IN WATER 50 %
15 SYRINGE (ML) INTRAVENOUS ONCE
Refills: 0 | Status: DISCONTINUED | OUTPATIENT
Start: 2024-04-24 | End: 2024-05-01

## 2024-04-24 RX ORDER — INSULIN LISPRO 100/ML
VIAL (ML) SUBCUTANEOUS EVERY 6 HOURS
Refills: 0 | Status: DISCONTINUED | OUTPATIENT
Start: 2024-04-24 | End: 2024-04-24

## 2024-04-24 RX ORDER — DEXTROSE 50 % IN WATER 50 %
25 SYRINGE (ML) INTRAVENOUS ONCE
Refills: 0 | Status: DISCONTINUED | OUTPATIENT
Start: 2024-04-24 | End: 2024-05-01

## 2024-04-24 RX ORDER — DEXTROSE 10 % IN WATER 10 %
125 INTRAVENOUS SOLUTION INTRAVENOUS ONCE
Refills: 0 | Status: DISCONTINUED | OUTPATIENT
Start: 2024-04-24 | End: 2024-05-01

## 2024-04-24 RX ORDER — SODIUM CHLORIDE 9 MG/ML
500 INJECTION INTRAMUSCULAR; INTRAVENOUS; SUBCUTANEOUS
Refills: 0 | Status: DISCONTINUED | OUTPATIENT
Start: 2024-04-24 | End: 2024-05-01

## 2024-04-24 RX ORDER — DEXTROSE 50 % IN WATER 50 %
12.5 SYRINGE (ML) INTRAVENOUS ONCE
Refills: 0 | Status: DISCONTINUED | OUTPATIENT
Start: 2024-04-24 | End: 2024-05-01

## 2024-04-24 RX ORDER — INSULIN LISPRO 100/ML
VIAL (ML) SUBCUTANEOUS AT BEDTIME
Refills: 0 | Status: DISCONTINUED | OUTPATIENT
Start: 2024-04-24 | End: 2024-04-24

## 2024-04-24 RX ORDER — INSULIN LISPRO 100/ML
VIAL (ML) SUBCUTANEOUS
Refills: 0 | Status: DISCONTINUED | OUTPATIENT
Start: 2024-04-24 | End: 2024-05-01

## 2024-04-24 RX ADMIN — AMLODIPINE BESYLATE 5 MILLIGRAM(S): 2.5 TABLET ORAL at 05:42

## 2024-04-24 NOTE — PROGRESS NOTE ADULT - PROBLEM SELECTOR PLAN 2
With resultant acute blood loss anemia.  - ddx as above   - plan for possible colonoscopy today With resultant acute blood loss anemia.  - Likely 2/2 colonic malignancy

## 2024-04-24 NOTE — CONSULT NOTE ADULT - ATTENDING COMMENTS
Agree with above. Patient presents with abdominal bloating/pain, and also new hematochezia x ~1.5 weeks. He reports NOT having had prior colonoscopy before. CT images reviewed personally. Unclear if thickening is suggestive of possible radiation injury given prior prostate CA, vs possible neoplasm. Will plan for colonoscopy tomorrow for further evaluation. Keep on clears tonight, NPO after midnight.
I saw and examined the pt on 4/25/24 at 8:25 am.  Comfortable.  Abdomen soft, NT, ND.  I discussed his dx and tx plan with the pt and his niece Anamaria as per his request.  Niece reports he has been having abd pain and large rectal bleeding.  H/o prostate Ca tx with RT one year ago, has been on Zoladex, last dose 3 weeks ago, next dose is supposed to be in June.  Has partially obstructing colon mass causing bleeding, anemia.  Recommended lap resection of rectosigmoid with intent for anastomosis. Discussed the possibility for need for diverting loop ileostomy if intraop the rectum appears affected by the pelvic RT. Possibility of need for colostomy is relatively low all things considered.   Will follow chest CT results, CEA.  Medical clearance.  Will look for OR time for next week.  Niece states they don't know the stage of his prostate Ca.   He needs PSA and Urology consult for input and also for intraop cysto and B/L u-caths given the pelvic RT.

## 2024-04-24 NOTE — PROGRESS NOTE ADULT - SUBJECTIVE AND OBJECTIVE BOX
Bryon Dodge MD  Division of Hospital Medicine  Available via MS teams  ---------------------------------------------------------    NEHEMIAS GARCIA  79y  Male      Patient is a 79y old  Male who presents with a chief complaint of     INTERVAL HPI/OVERNIGHT EVENTS:  INCOMPLETE      REVIEW OF SYSTEMS: 10 point ROS negative unless listed above    T(C): 37.1 (04-24-24 @ 05:38), Max: 37.1 (04-24-24 @ 05:38)  HR: 70 (04-24-24 @ 05:38) (69 - 85)  BP: 113/70 (04-24-24 @ 05:38) (113/70 - 187/89)  RR: 18 (04-24-24 @ 05:38) (18 - 18)  SpO2: 98% (04-24-24 @ 05:38) (96% - 98%)  Wt(kg): --Vital Signs Last 24 Hrs  T(C): 37.1 (24 Apr 2024 05:38), Max: 37.1 (24 Apr 2024 05:38)  T(F): 98.7 (24 Apr 2024 05:38), Max: 98.7 (24 Apr 2024 05:38)  HR: 70 (24 Apr 2024 05:38) (69 - 85)  BP: 113/70 (24 Apr 2024 05:38) (113/70 - 187/89)  BP(mean): --  RR: 18 (24 Apr 2024 05:38) (18 - 18)  SpO2: 98% (24 Apr 2024 05:38) (96% - 98%)    Parameters below as of 24 Apr 2024 05:38  Patient On (Oxygen Delivery Method): room air        PHYSICAL EXAM:  GENERAL: NAD, well-groomed, well-developed  NECK: Supple, No JVD  CHEST/LUNG: Clear to auscultation bilaterally; No rales, rhonchi, wheezing, or rubs  HEART: Regular rate and rhythm; No murmurs, rubs, or gallops  ABDOMEN: Soft, Nontender, Nondistended; Bowel sounds present.    EXTREMITIES:  2+ Peripheral Pulses, No clubbing, cyanosis, or edema  PSYCH: Alert & Oriented x3        LABS:                        9.8    6.62  )-----------( 252      ( 24 Apr 2024 07:02 )             30.8     04-23    141  |  105  |  16  ----------------------------<  104<H>  3.9   |  24  |  0.85    Ca    9.2      23 Apr 2024 06:48  Phos  3.5     04-23  Mg     2.0     04-23    TPro  7.5  /  Alb  4.3  /  TBili  0.2  /  DBili  x   /  AST  50<H>  /  ALT  34  /  AlkPhos  78  04-22    PT/INR - ( 24 Apr 2024 07:02 )   PT: 11.7 sec;   INR: 1.07 ratio         PTT - ( 24 Apr 2024 07:02 )  PTT:28.4 sec  Urinalysis Basic - ( 23 Apr 2024 06:48 )    Color: x / Appearance: x / SG: x / pH: x  Gluc: 104 mg/dL / Ketone: x  / Bili: x / Urobili: x   Blood: x / Protein: x / Nitrite: x   Leuk Esterase: x / RBC: x / WBC x   Sq Epi: x / Non Sq Epi: x / Bacteria: x      CAPILLARY BLOOD GLUCOSE      POCT Blood Glucose.: 119 mg/dL (24 Apr 2024 05:39)  POCT Blood Glucose.: 116 mg/dL (23 Apr 2024 22:20)  POCT Blood Glucose.: 140 mg/dL (23 Apr 2024 17:54)  POCT Blood Glucose.: 119 mg/dL (23 Apr 2024 11:29)        Urinalysis Basic - ( 23 Apr 2024 06:48 )    Color: x / Appearance: x / SG: x / pH: x  Gluc: 104 mg/dL / Ketone: x  / Bili: x / Urobili: x   Blood: x / Protein: x / Nitrite: x   Leuk Esterase: x / RBC: x / WBC x   Sq Epi: x / Non Sq Epi: x / Bacteria: x        RADIOLOGY & ADDITIONAL TESTS:    Imaging Personally Reviewed:  [ ] YES  [ ] NO Bryon Dodge MD  Division of Hospital Medicine  Available via MS teams  ---------------------------------------------------------    NEHEMIAS GARCIA  79y  Male      Patient is a 79y old  Male who presents with a chief complaint of     INTERVAL HPI/OVERNIGHT EVENTS:  Seen at bedside. Had colonoscopy today. Mass seen      REVIEW OF SYSTEMS: 10 point ROS negative unless listed above    T(C): 37.1 (04-24-24 @ 05:38), Max: 37.1 (04-24-24 @ 05:38)  HR: 70 (04-24-24 @ 05:38) (69 - 85)  BP: 113/70 (04-24-24 @ 05:38) (113/70 - 187/89)  RR: 18 (04-24-24 @ 05:38) (18 - 18)  SpO2: 98% (04-24-24 @ 05:38) (96% - 98%)  Wt(kg): --Vital Signs Last 24 Hrs  T(C): 37.1 (24 Apr 2024 05:38), Max: 37.1 (24 Apr 2024 05:38)  T(F): 98.7 (24 Apr 2024 05:38), Max: 98.7 (24 Apr 2024 05:38)  HR: 70 (24 Apr 2024 05:38) (69 - 85)  BP: 113/70 (24 Apr 2024 05:38) (113/70 - 187/89)  BP(mean): --  RR: 18 (24 Apr 2024 05:38) (18 - 18)  SpO2: 98% (24 Apr 2024 05:38) (96% - 98%)    Parameters below as of 24 Apr 2024 05:38  Patient On (Oxygen Delivery Method): room air        PHYSICAL EXAM:  GENERAL: NAD, well-groomed, well-developed  NECK: Supple, No JVD  CHEST/LUNG: Clear to auscultation bilaterally; No rales, rhonchi, wheezing, or rubs  HEART: Regular rate and rhythm; No murmurs, rubs, or gallops  ABDOMEN: Soft, Nontender, Nondistended; Bowel sounds present.    EXTREMITIES:  2+ Peripheral Pulses, No clubbing, cyanosis, or edema  PSYCH: Alert & Oriented x3        LABS:                        9.8    6.62  )-----------( 252      ( 24 Apr 2024 07:02 )             30.8     04-23    141  |  105  |  16  ----------------------------<  104<H>  3.9   |  24  |  0.85    Ca    9.2      23 Apr 2024 06:48  Phos  3.5     04-23  Mg     2.0     04-23    TPro  7.5  /  Alb  4.3  /  TBili  0.2  /  DBili  x   /  AST  50<H>  /  ALT  34  /  AlkPhos  78  04-22    PT/INR - ( 24 Apr 2024 07:02 )   PT: 11.7 sec;   INR: 1.07 ratio         PTT - ( 24 Apr 2024 07:02 )  PTT:28.4 sec  Urinalysis Basic - ( 23 Apr 2024 06:48 )    Color: x / Appearance: x / SG: x / pH: x  Gluc: 104 mg/dL / Ketone: x  / Bili: x / Urobili: x   Blood: x / Protein: x / Nitrite: x   Leuk Esterase: x / RBC: x / WBC x   Sq Epi: x / Non Sq Epi: x / Bacteria: x      CAPILLARY BLOOD GLUCOSE      POCT Blood Glucose.: 119 mg/dL (24 Apr 2024 05:39)  POCT Blood Glucose.: 116 mg/dL (23 Apr 2024 22:20)  POCT Blood Glucose.: 140 mg/dL (23 Apr 2024 17:54)  POCT Blood Glucose.: 119 mg/dL (23 Apr 2024 11:29)        Urinalysis Basic - ( 23 Apr 2024 06:48 )    Color: x / Appearance: x / SG: x / pH: x  Gluc: 104 mg/dL / Ketone: x  / Bili: x / Urobili: x   Blood: x / Protein: x / Nitrite: x   Leuk Esterase: x / RBC: x / WBC x   Sq Epi: x / Non Sq Epi: x / Bacteria: x        RADIOLOGY & ADDITIONAL TESTS:    Imaging Personally Reviewed:  [ ] YES  [ ] NO

## 2024-04-24 NOTE — PATIENT PROFILE ADULT - TOBACCO USE
BMI is likely elevated from muscle mass, mostly in legs. Due for numerous HCM items including lipid panel, colon cancer screening, hepatitis C screen. Discuss shingles and screen for hearing issues at next visit.  
Residual s/p 6 mo of MSK/bone pain of right humerus is puzzling. Has full range of motion. Discussed option of XR, PT, or sports medicine referral and decided on later.  
Stable on current management. Continue current management and monitoring. Refills provided.  
Stable on current management. Continue current management and monitoring. Refills provided.  
Stable, but interested in relationship with optho to optimize vision, will refer.  
Never smoker

## 2024-04-24 NOTE — CONSULT NOTE ADULT - ASSESSMENT
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 No active hemorrhage from mass at this time, GARCIA without blood, no abd pain, tolerting diet and having bowel function. H/H stable. Surgery consulted for colon mass.    Plan:  - recommend metastic workup CT chest noncon, CEA, AFP  - patient is visiting from [place] so will need to discuss timing of any resection after workup vs finding surgeon in [place] if patient prefers  - colorectal surgery will follow    Discussed with Dr. Stephani [facility] #[id number]  79 year old male with  PMH HTN, DM, prostate ca s/p RT presents with hematochezia found to have concern for colonic malignancy on CT, no liver lesions, colonoscopy revealed rectosigmoid mass 15cm from anal verge nonobstructive no synchronous lesions. No active hemorrhage from mass at this time, GARCIA without blood, no abd pain, tolerting diet and having bowel function. H/H stable. Surgery consulted for colon mass.    Plan:  - recommend metastatic workup CT chest noncon, CEA, AFP  - follow up pathology  - patient is visiting from [place] so will need to discuss timing of any resection after workup vs finding surgeon in [place] if patient prefers  - colorectal surgery will follow    Discussed with Dr. Styles    [facility] #[id number]

## 2024-04-24 NOTE — PRE PROCEDURE NOTE - PRE PROCEDURE EVALUATION
Attending Physician: Dr Alarcon                            Procedure:  colonoscopy     Indication for Procedure: Colonic wall thickening on CT  ________________________________________________________  PAST MEDICAL & SURGICAL HISTORY:  Hypertension      Hyperlipidemia      Diabetes mellitus      Prostate cancer        ALLERGIES:  No Known Allergies    HOME MEDICATIONS:  Covarem (perindopril and amlodipine): unknown dose or fq.  metformin: unknown dose    AICD/PPM: [ ] yes   [x ] no    PERTINENT LAB DATA:                        9.8    6.62  )-----------( 252      ( 24 Apr 2024 07:02 )             30.8     04-24    137  |  101  |  11  ----------------------------<  112<H>  4.2   |  25  |  0.88    Ca    9.1      24 Apr 2024 06:51  Phos  3.5     04-23  Mg     2.0     04-23    TPro  7.5  /  Alb  4.3  /  TBili  0.2  /  DBili  x   /  AST  50<H>  /  ALT  34  /  AlkPhos  78  04-22    PT/INR - ( 24 Apr 2024 07:02 )   PT: 11.7 sec;   INR: 1.07 ratio         PTT - ( 24 Apr 2024 07:02 )  PTT:28.4 sec            PHYSICAL EXAMINATION:    Height (cm): 162.6  Weight (kg): 81.6  BMI (kg/m2): 30.9  BSA (m2): 1.87T(C): 36.1  HR: 85  BP: 174/78  RR: 16  SpO2: 100%    Constitutional: NAD    Neck:  No JVD  Respiratory: no resp distress   Cardiovascular: RRR  Extremities: No peripheral edema  Neurological: A/O x 4, no focal deficits        COMMENTS:    The patient is a suitable candidate for the planned procedure unless box checked [ ]  No, explain:

## 2024-04-24 NOTE — PROVIDER CONTACT NOTE (OTHER) - RECOMMENDATIONS
RN contacted provider multiple times to change pts FS to q6. RN asked provider if he wanted midnight finger stick completed and pending response.

## 2024-04-24 NOTE — PATIENT PROFILE ADULT - FUNCTIONAL ASSESSMENT - DAILY ACTIVITY 1.
Normal vision: sees adequately in most situations; can see medication labels, newsprint 4 = No assist / stand by assistance

## 2024-04-25 DIAGNOSIS — K63.89 OTHER SPECIFIED DISEASES OF INTESTINE: ICD-10-CM

## 2024-04-25 PROBLEM — Z00.00 ENCOUNTER FOR PREVENTIVE HEALTH EXAMINATION: Status: ACTIVE | Noted: 2024-04-25

## 2024-04-25 LAB
CEA SERPL-MCNC: 2.5 NG/ML — SIGNIFICANT CHANGE UP (ref 0–3.8)
GLUCOSE BLDC GLUCOMTR-MCNC: 120 MG/DL — HIGH (ref 70–99)
GLUCOSE BLDC GLUCOMTR-MCNC: 147 MG/DL — HIGH (ref 70–99)
GLUCOSE BLDC GLUCOMTR-MCNC: 155 MG/DL — HIGH (ref 70–99)
GLUCOSE BLDC GLUCOMTR-MCNC: 185 MG/DL — HIGH (ref 70–99)
HCT VFR BLD CALC: 31.1 % — LOW (ref 39–50)
HGB BLD-MCNC: 9.5 G/DL — LOW (ref 13–17)
MCHC RBC-ENTMCNC: 25.5 PG — LOW (ref 27–34)
MCHC RBC-ENTMCNC: 30.5 GM/DL — LOW (ref 32–36)
MCV RBC AUTO: 83.4 FL — SIGNIFICANT CHANGE UP (ref 80–100)
NRBC # BLD: 0 /100 WBCS — SIGNIFICANT CHANGE UP (ref 0–0)
PLATELET # BLD AUTO: 270 K/UL — SIGNIFICANT CHANGE UP (ref 150–400)
RBC # BLD: 3.73 M/UL — LOW (ref 4.2–5.8)
RBC # FLD: 13.6 % — SIGNIFICANT CHANGE UP (ref 10.3–14.5)
WBC # BLD: 9.98 K/UL — SIGNIFICANT CHANGE UP (ref 3.8–10.5)
WBC # FLD AUTO: 9.98 K/UL — SIGNIFICANT CHANGE UP (ref 3.8–10.5)

## 2024-04-25 PROCEDURE — 99232 SBSQ HOSP IP/OBS MODERATE 35: CPT

## 2024-04-25 PROCEDURE — 71250 CT THORAX DX C-: CPT | Mod: 26

## 2024-04-25 PROCEDURE — 99223 1ST HOSP IP/OBS HIGH 75: CPT

## 2024-04-25 PROCEDURE — 99232 SBSQ HOSP IP/OBS MODERATE 35: CPT | Mod: GC

## 2024-04-25 RX ADMIN — Medication 1: at 18:08

## 2024-04-25 RX ADMIN — Medication 1: at 12:24

## 2024-04-25 RX ADMIN — AMLODIPINE BESYLATE 5 MILLIGRAM(S): 2.5 TABLET ORAL at 05:11

## 2024-04-25 NOTE — PROGRESS NOTE ADULT - PROBLEM SELECTOR PLAN 1
CT with thickening in sigmoid colon. DDx includes colonic malignancy  - colonoscopy with partial obstructing mass in colon. Needs CT chest for staging  - Seen by surgery, f/u rec's regarding possible resection  - Trend H&H and transfuse for goal hgb > 7

## 2024-04-25 NOTE — PROGRESS NOTE ADULT - SUBJECTIVE AND OBJECTIVE BOX
Bryon Dodge MD  Division of Hospital Medicine  Available via MS teams  ---------------------------------------------------------    NEHEMIAS GARCIA  79y  Male      Patient is a 79y old  Male who presents with a chief complaint of     INTERVAL HPI/OVERNIGHT EVENTS:  INCOMPLETE      REVIEW OF SYSTEMS: 10 point ROS negative unless listed above    T(C): 36.6 (04-25-24 @ 05:30), Max: 37.5 (04-24-24 @ 20:25)  HR: 65 (04-25-24 @ 05:30) (64 - 85)  BP: 106/57 (04-25-24 @ 00:33) (103/61 - 174/78)  RR: 18 (04-25-24 @ 05:30) (15 - 21)  SpO2: 93% (04-25-24 @ 05:30) (93% - 100%)  Wt(kg): --Vital Signs Last 24 Hrs  T(C): 36.6 (25 Apr 2024 05:30), Max: 37.5 (24 Apr 2024 20:25)  T(F): 97.9 (25 Apr 2024 05:30), Max: 99.5 (24 Apr 2024 20:25)  HR: 65 (25 Apr 2024 05:30) (64 - 85)  BP: 106/57 (25 Apr 2024 00:33) (103/61 - 174/78)  BP(mean): 93 (24 Apr 2024 09:12) (93 - 93)  RR: 18 (25 Apr 2024 05:30) (15 - 21)  SpO2: 93% (25 Apr 2024 05:30) (93% - 100%)    Parameters below as of 25 Apr 2024 05:30  Patient On (Oxygen Delivery Method): room air        PHYSICAL EXAM:  GENERAL: NAD, well-groomed, well-developed  NECK: Supple, No JVD  CHEST/LUNG: Clear to auscultation bilaterally; No rales, rhonchi, wheezing, or rubs  HEART: Regular rate and rhythm; No murmurs, rubs, or gallops  ABDOMEN: Soft, Nontender, Nondistended; Bowel sounds present.    EXTREMITIES:  2+ Peripheral Pulses, No clubbing, cyanosis, or edema  PSYCH: Alert & Oriented x3          LABS:                        9.8    6.62  )-----------( 252      ( 24 Apr 2024 07:02 )             30.8     04-24    137  |  101  |  11  ----------------------------<  112<H>  4.2   |  25  |  0.88    Ca    9.1      24 Apr 2024 06:51      PT/INR - ( 24 Apr 2024 07:02 )   PT: 11.7 sec;   INR: 1.07 ratio         PTT - ( 24 Apr 2024 07:02 )  PTT:28.4 sec  Urinalysis Basic - ( 24 Apr 2024 06:51 )    Color: x / Appearance: x / SG: x / pH: x  Gluc: 112 mg/dL / Ketone: x  / Bili: x / Urobili: x   Blood: x / Protein: x / Nitrite: x   Leuk Esterase: x / RBC: x / WBC x   Sq Epi: x / Non Sq Epi: x / Bacteria: x      CAPILLARY BLOOD GLUCOSE      POCT Blood Glucose.: 133 mg/dL (24 Apr 2024 21:05)  POCT Blood Glucose.: 139 mg/dL (24 Apr 2024 17:05)  POCT Blood Glucose.: 125 mg/dL (24 Apr 2024 11:52)        Urinalysis Basic - ( 24 Apr 2024 06:51 )    Color: x / Appearance: x / SG: x / pH: x  Gluc: 112 mg/dL / Ketone: x  / Bili: x / Urobili: x   Blood: x / Protein: x / Nitrite: x   Leuk Esterase: x / RBC: x / WBC x   Sq Epi: x / Non Sq Epi: x / Bacteria: x        RADIOLOGY & ADDITIONAL TESTS:    Imaging Personally Reviewed:  [ ] YES  [ ] NO Bryon Dodge MD  Division of Hospital Medicine  Available via MS teams  ---------------------------------------------------------    NEHEMIAS GARCIA  79y  Male      Patient is a 79y old  Male who presents with a chief complaint of     INTERVAL HPI/OVERNIGHT EVENTS:  Seen at bedside. Currently denies any symptoms      REVIEW OF SYSTEMS: 10 point ROS negative unless listed above    T(C): 36.6 (04-25-24 @ 05:30), Max: 37.5 (04-24-24 @ 20:25)  HR: 65 (04-25-24 @ 05:30) (64 - 85)  BP: 106/57 (04-25-24 @ 00:33) (103/61 - 174/78)  RR: 18 (04-25-24 @ 05:30) (15 - 21)  SpO2: 93% (04-25-24 @ 05:30) (93% - 100%)  Wt(kg): --Vital Signs Last 24 Hrs  T(C): 36.6 (25 Apr 2024 05:30), Max: 37.5 (24 Apr 2024 20:25)  T(F): 97.9 (25 Apr 2024 05:30), Max: 99.5 (24 Apr 2024 20:25)  HR: 65 (25 Apr 2024 05:30) (64 - 85)  BP: 106/57 (25 Apr 2024 00:33) (103/61 - 174/78)  BP(mean): 93 (24 Apr 2024 09:12) (93 - 93)  RR: 18 (25 Apr 2024 05:30) (15 - 21)  SpO2: 93% (25 Apr 2024 05:30) (93% - 100%)    Parameters below as of 25 Apr 2024 05:30  Patient On (Oxygen Delivery Method): room air        PHYSICAL EXAM:  GENERAL: NAD, well-groomed, well-developed  NECK: Supple, No JVD  CHEST/LUNG: Clear to auscultation bilaterally; No rales, rhonchi, wheezing, or rubs  HEART: Regular rate and rhythm; No murmurs, rubs, or gallops  ABDOMEN: Soft, Nontender, Nondistended; Bowel sounds present.    EXTREMITIES:  2+ Peripheral Pulses, No clubbing, cyanosis, or edema  PSYCH: Alert & Oriented x3          LABS:                        9.8    6.62  )-----------( 252      ( 24 Apr 2024 07:02 )             30.8     04-24    137  |  101  |  11  ----------------------------<  112<H>  4.2   |  25  |  0.88    Ca    9.1      24 Apr 2024 06:51      PT/INR - ( 24 Apr 2024 07:02 )   PT: 11.7 sec;   INR: 1.07 ratio         PTT - ( 24 Apr 2024 07:02 )  PTT:28.4 sec  Urinalysis Basic - ( 24 Apr 2024 06:51 )    Color: x / Appearance: x / SG: x / pH: x  Gluc: 112 mg/dL / Ketone: x  / Bili: x / Urobili: x   Blood: x / Protein: x / Nitrite: x   Leuk Esterase: x / RBC: x / WBC x   Sq Epi: x / Non Sq Epi: x / Bacteria: x      CAPILLARY BLOOD GLUCOSE      POCT Blood Glucose.: 133 mg/dL (24 Apr 2024 21:05)  POCT Blood Glucose.: 139 mg/dL (24 Apr 2024 17:05)  POCT Blood Glucose.: 125 mg/dL (24 Apr 2024 11:52)        Urinalysis Basic - ( 24 Apr 2024 06:51 )    Color: x / Appearance: x / SG: x / pH: x  Gluc: 112 mg/dL / Ketone: x  / Bili: x / Urobili: x   Blood: x / Protein: x / Nitrite: x   Leuk Esterase: x / RBC: x / WBC x   Sq Epi: x / Non Sq Epi: x / Bacteria: x        RADIOLOGY & ADDITIONAL TESTS:    Imaging Personally Reviewed:  [ ] YES  [ ] NO

## 2024-04-25 NOTE — PROGRESS NOTE ADULT - SUBJECTIVE AND OBJECTIVE BOX
COLORECTAL SURGERY DAILY PROGRESS NOTE    24 Hour/Overnight Events: No acute events overnight    SUBJECTIVE: Patient seen and evaluated on AM rounds. Pt is resting comfortably in bed with no acute complaints. Tolerating diet. Ambulating well. Denies fevers/chills, chest pain, dyspnea, abdominal pain, nausea, vomiting, and diarrhea    ------------------------------------------------------------------------------------------------------------  OBJECTIVE:  Vital Signs Last 24 Hrs  T(C): 37 (25 Apr 2024 08:40), Max: 37.5 (24 Apr 2024 20:25)  T(F): 98.6 (25 Apr 2024 08:40), Max: 99.5 (24 Apr 2024 20:25)  HR: 65 (25 Apr 2024 08:40) (64 - 85)  BP: 122/63 (25 Apr 2024 08:40) (103/61 - 174/78)  BP(mean): 93 (24 Apr 2024 09:12) (93 - 93)  RR: 18 (25 Apr 2024 08:40) (15 - 21)  SpO2: 95% (25 Apr 2024 08:40) (93% - 100%)    Parameters below as of 25 Apr 2024 08:40  Patient On (Oxygen Delivery Method): room air      I&O's Detail    24 Apr 2024 07:01  -  25 Apr 2024 07:00  --------------------------------------------------------  IN:  Total IN: 0 mL    OUT:    Voided (mL): 900 mL  Total OUT: 900 mL    Total NET: -900 mL      25 Apr 2024 07:01  -  25 Apr 2024 08:52  --------------------------------------------------------  IN:    Oral Fluid: 240 mL  Total IN: 240 mL    OUT:  Total OUT: 0 mL    Total NET: 240 mL      LABS:                        9.5    9.98  )-----------( 270      ( 25 Apr 2024 07:16 )             31.1     04-24    137  |  101  |  11  ----------------------------<  112<H>  4.2   |  25  |  0.88    Ca    9.1      24 Apr 2024 06:51    PT/INR - ( 24 Apr 2024 07:02 )   PT: 11.7 sec;   INR: 1.07 ratio     PTT - ( 24 Apr 2024 07:02 )  PTT:28.4 sec    Urinalysis Basic - ( 24 Apr 2024 06:51 )  Color: x / Appearance: x / SG: x / pH: x  Gluc: 112 mg/dL / Ketone: x  / Bili: x / Urobili: x   Blood: x / Protein: x / Nitrite: x   Leuk Esterase: x / RBC: x / WBC x   Sq Epi: x / Non Sq Epi: x / Bacteria: x      PHYSICAL EXAM:  Constitutional: Well developed, well nourished, NAD  Pulmonary: Symmetric chest rise bilaterally, no increased WOB  Gastrointestinal: Abdomen soft, nontender, nondistended  Extremities: Warm to touch, soft, normal strength, sensory and motor intact. No cyanosis/erythema/edema/hematoma

## 2024-04-25 NOTE — PROGRESS NOTE ADULT - ASSESSMENT
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 No active hemorrhage from mass at this time, GARCIA without blood, no abd pain, tolerting diet and having bowel function. H/H stable. Surgery consulted for colon mass.    PLAN:  - F/u metastatic workup: CT chest noncon, CEA, AFP  - Follow up pathology  - Possible OR on this admission, likely next week  - Care as per primary team      [facility]  [phone]

## 2024-04-25 NOTE — PROGRESS NOTE ADULT - ASSESSMENT
79year old male with HTN, T2DM, h/o prostate ca s/p RT. He's currently visiting from [place] and came to ED for BRBPR x3 times in the past few days.     Assessment  #Hematochezia  #Prostate CA s/p RT  #Abnormal CT findings  - DDx: Radiation proctitis vs. colonic malignancy vs. ulcer vs. AVM's vs. 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 Extending from 20 cm from the anus to 15 cm from the   anal verge. Biopsied. Tattooed. Likely the source of recent hematochezia.    - Diverticulosis in the sigmoid colon.    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    Recommendation:    - Advance diet as tolerated.  - Await pathology results.  - Surgical oncology consultation appreciated. Patient prefers to continue with treatment here.  - F/u CT chest for staging  - F/u tumor makers  - Trend H&H and transfuse as needed  - GI team to sign off. Please reconsult as needed      Note incomplete until finalized by attending signature/attestation.    Verito Osei  GI/Hepatology Fellow    MONDAY-FRIDAY 8AM-5PM:  Pager# [phone] ([facility]) or [phone] ([facility])    NON-URGENT CONSULTS:  Please email [email or web address]  AT NIGHT AND ON WEEKENDS:  Contact on-call GI fellow via [employer] by paging or hospital  from 5pm-8am and on weekends/holidays

## 2024-04-25 NOTE — PROGRESS NOTE ADULT - SUBJECTIVE AND OBJECTIVE BOX
Interval Events:   No acute events overnight.    Patient states having a small amount of hematochezia, otherwise no acute complaints.     Hospital Medications:  acetaminophen     Tablet .. 650 milliGRAM(s) Oral every 6 hours PRN  aluminum hydroxide/magnesium hydroxide/simethicone Suspension 30 milliLiter(s) Oral every 4 hours PRN  amLODIPine   Tablet 5 milliGRAM(s) Oral daily  dextrose 10% Bolus 125 milliLiter(s) IV Bolus once  dextrose 5%. 1000 milliLiter(s) IV Continuous <Continuous>  dextrose 5%. 1000 milliLiter(s) IV Continuous <Continuous>  dextrose 50% Injectable 12.5 Gram(s) IV Push once  dextrose 50% Injectable 25 Gram(s) IV Push once  dextrose Oral Gel 15 Gram(s) Oral once PRN  glucagon  Injectable 1 milliGRAM(s) IntraMuscular once  insulin lispro (ADMELOG) corrective regimen sliding scale   SubCutaneous three times a day before meals  insulin lispro (ADMELOG) corrective regimen sliding scale   SubCutaneous at bedtime  melatonin 3 milliGRAM(s) Oral at bedtime PRN  ondansetron Injectable 4 milliGRAM(s) IV Push every 8 hours PRN  sodium chloride 0.9%. 500 milliLiter(s) IV Continuous <Continuous>      PHYSICAL EXAM:   Vital Signs:  Vital Signs Last 24 Hrs  T(C): 37 (25 Apr 2024 08:40), Max: 37.5 (24 Apr 2024 20:25)  T(F): 98.6 (25 Apr 2024 08:40), Max: 99.5 (24 Apr 2024 20:25)  HR: 65 (25 Apr 2024 08:40) (64 - 85)  BP: 122/63 (25 Apr 2024 08:40) (103/61 - 174/78)  BP(mean): 93 (24 Apr 2024 09:12) (93 - 93)  RR: 18 (25 Apr 2024 08:40) (15 - 21)  SpO2: 95% (25 Apr 2024 08:40) (93% - 100%)    Parameters below as of 25 Apr 2024 08:40  Patient On (Oxygen Delivery Method): room air      Daily Height in cm: 162.56 (24 Apr 2024 09:12)    Daily     GENERAL: no acute distress  NEURO: alert and oriented x 3  HEENT: NCAT, no conjunctival pallor appreciated; anicteric sclera  CHEST: no respiratory distress, no accessory muscle use  CARDIAC: regular rate, +S1/S2  ABDOMEN: soft, nontender, no rebound or guarding  EXTREMITIES: warm, well perfused  SKIN: no active lesions noted    LABS: reviewed                        9.5    9.98  )-----------( 270      ( 25 Apr 2024 07:16 )             31.1     04-24    137  |  101  |  11  ----------------------------<  112<H>  4.2   |  25  |  0.88    Ca    9.1      24 Apr 2024 06:51

## 2024-04-26 ENCOUNTER — RESULT REVIEW (OUTPATIENT)
Age: 79
End: 2024-04-26

## 2024-04-26 PROBLEM — C61 MALIGNANT NEOPLASM OF PROSTATE: Chronic | Status: ACTIVE | Noted: 2024-04-22

## 2024-04-26 PROBLEM — E78.5 HYPERLIPIDEMIA, UNSPECIFIED: Chronic | Status: ACTIVE | Noted: 2024-04-22

## 2024-04-26 PROBLEM — I10 ESSENTIAL (PRIMARY) HYPERTENSION: Chronic | Status: ACTIVE | Noted: 2024-04-22

## 2024-04-26 PROBLEM — E11.9 TYPE 2 DIABETES MELLITUS WITHOUT COMPLICATIONS: Chronic | Status: ACTIVE | Noted: 2024-04-22

## 2024-04-26 LAB
ANION GAP SERPL CALC-SCNC: 11 MMOL/L — SIGNIFICANT CHANGE UP (ref 5–17)
BUN SERPL-MCNC: 16 MG/DL — SIGNIFICANT CHANGE UP (ref 7–23)
CALCIUM SERPL-MCNC: 9.3 MG/DL — SIGNIFICANT CHANGE UP (ref 8.4–10.5)
CHLORIDE SERPL-SCNC: 103 MMOL/L — SIGNIFICANT CHANGE UP (ref 96–108)
CO2 SERPL-SCNC: 24 MMOL/L — SIGNIFICANT CHANGE UP (ref 22–31)
CREAT SERPL-MCNC: 0.93 MG/DL — SIGNIFICANT CHANGE UP (ref 0.5–1.3)
EGFR: 84 ML/MIN/1.73M2 — SIGNIFICANT CHANGE UP
GLUCOSE BLDC GLUCOMTR-MCNC: 122 MG/DL — HIGH (ref 70–99)
GLUCOSE BLDC GLUCOMTR-MCNC: 165 MG/DL — HIGH (ref 70–99)
GLUCOSE BLDC GLUCOMTR-MCNC: 167 MG/DL — HIGH (ref 70–99)
GLUCOSE BLDC GLUCOMTR-MCNC: 191 MG/DL — HIGH (ref 70–99)
GLUCOSE SERPL-MCNC: 122 MG/DL — HIGH (ref 70–99)
HCT VFR BLD CALC: 32.6 % — LOW (ref 39–50)
HGB BLD-MCNC: 10.1 G/DL — LOW (ref 13–17)
MCHC RBC-ENTMCNC: 26 PG — LOW (ref 27–34)
MCHC RBC-ENTMCNC: 31 GM/DL — LOW (ref 32–36)
MCV RBC AUTO: 83.8 FL — SIGNIFICANT CHANGE UP (ref 80–100)
NRBC # BLD: 0 /100 WBCS — SIGNIFICANT CHANGE UP (ref 0–0)
PLATELET # BLD AUTO: 292 K/UL — SIGNIFICANT CHANGE UP (ref 150–400)
POTASSIUM SERPL-MCNC: 4.4 MMOL/L — SIGNIFICANT CHANGE UP (ref 3.5–5.3)
POTASSIUM SERPL-SCNC: 4.4 MMOL/L — SIGNIFICANT CHANGE UP (ref 3.5–5.3)
RBC # BLD: 3.89 M/UL — LOW (ref 4.2–5.8)
RBC # FLD: 13.8 % — SIGNIFICANT CHANGE UP (ref 10.3–14.5)
SODIUM SERPL-SCNC: 138 MMOL/L — SIGNIFICANT CHANGE UP (ref 135–145)
SURGICAL PATHOLOGY STUDY: SIGNIFICANT CHANGE UP
WBC # BLD: 8.2 K/UL — SIGNIFICANT CHANGE UP (ref 3.8–10.5)
WBC # FLD AUTO: 8.2 K/UL — SIGNIFICANT CHANGE UP (ref 3.8–10.5)

## 2024-04-26 PROCEDURE — 93356 MYOCRD STRAIN IMG SPCKL TRCK: CPT

## 2024-04-26 PROCEDURE — 99233 SBSQ HOSP IP/OBS HIGH 50: CPT

## 2024-04-26 PROCEDURE — 99232 SBSQ HOSP IP/OBS MODERATE 35: CPT

## 2024-04-26 PROCEDURE — 93306 TTE W/DOPPLER COMPLETE: CPT | Mod: 26

## 2024-04-26 RX ADMIN — AMLODIPINE BESYLATE 5 MILLIGRAM(S): 2.5 TABLET ORAL at 05:55

## 2024-04-26 RX ADMIN — Medication 1: at 17:19

## 2024-04-26 RX ADMIN — Medication 1: at 12:26

## 2024-04-26 NOTE — PROGRESS NOTE ADULT - PROBLEM SELECTOR PLAN 1
CT with thickening in sigmoid colon. DDx includes colonic malignancy  - colonoscopy with partial obstructing mass in colon.   - CT chest for staging reviewed, prelime read negative for metastatic disease, f/u final read  - Seen by surgery, f/u rec's regarding possible resection, possible OR next week  - Trend H&H and transfuse for goal hgb > 7

## 2024-04-26 NOTE — PROGRESS NOTE ADULT - ASSESSMENT
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 No active hemorrhage from mass at this time, GARCIA without blood, no abd pain, tolerting diet and having bowel function. H/H stable. Surgery consulted for colon mass.    PLAN:  - CEA 2.5  - Please reorder AFP (drawn incorrectly)  - CT chest noncon prelim negative for mets; f/u final read  - Follow up pathology  - Possible OR on this admission, likely next week  - Care as per primary team      [facility]  [id number] 79 year old male with  PMH HTN, DM, prostate ca s/p RT presents with hematochezia found to have concern for colonic malignancy on CT, no liver lesions, colonoscopy revealed rectosigmoid mass 15cm from anal verge nonobstructive no synchronous lesions. No active hemorrhage from mass at this time, GARCIA without blood, no abd pain, tolerting diet and having bowel function. H/H stable. Surgery consulted for colon mass.    PLAN:  - CEA 2.5  - Please reorder AFP (drawn incorrectly)  - CT chest noncon prelim negative for mets; f/u final read  - Follow up pathology  - Continue monitoring hgb  - Possible OR on this admission, likely next week  - Care as per primary team      [facility]  [phone]

## 2024-04-26 NOTE — CONSULT NOTE ADULT - ASSESSMENT
79 year old male with  PMH HTN, DM, prostate ca s/p RT presents with hematochezia found to have concern for colonic malignancy on CT, no liver lesions, colonoscopy revealed rectosigmoid mass 15cm from anal verge nonobstructive no synchronous lesions, general surgery planning on doing a colonic resection 4/1 and asked for urology to be present for BL ureteral catheters.     Patient was diagnosed with prostate cancer in [place] and had radiation 1 year ago, reports urologist checked PSA recently and levels were undetectable. UA and urine cx negative. Denies any urologic symptoms.     Plan:  - Dr. Wyatt is available to place bilateral ureteral catheters 5/1  - Urine cx negative  - Consent in the chart     Discussed with Dr. Wyatt  [facility]  [address] [place] [address]  [phone]

## 2024-04-26 NOTE — PROGRESS NOTE ADULT - SUBJECTIVE AND OBJECTIVE BOX
SUBJECTIVE  NAEO. One time BP to 178/67 otherwise HDS. Pt comfortable in bed. Tolerating diet, having regular bm. Denies N/V/abdominal pain/bloody stools.    OBJECTIVE  ___________________________________________________  VITAL SIGNS / I&O's   Vital Signs Last 24 Hrs  T(C): 37.7 (26 Apr 2024 04:58), Max: 37.7 (26 Apr 2024 04:58)  T(F): 99.8 (26 Apr 2024 04:58), Max: 99.8 (26 Apr 2024 04:58)  HR: 75 (26 Apr 2024 04:58) (61 - 82)  BP: 125/62 (26 Apr 2024 04:58) (122/63 - 178/67)  BP(mean): --  RR: 18 (26 Apr 2024 04:58) (18 - 18)  SpO2: 97% (26 Apr 2024 04:58) (95% - 98%)    Parameters below as of 26 Apr 2024 04:58  Patient On (Oxygen Delivery Method): room air          24 Apr 2024 07:01  -  25 Apr 2024 07:00  --------------------------------------------------------  IN:  Total IN: 0 mL    OUT:    Voided (mL): 900 mL  Total OUT: 900 mL    Total NET: -900 mL      25 Apr 2024 07:01  -  26 Apr 2024 05:39  --------------------------------------------------------  IN:    Oral Fluid: 730 mL  Total IN: 730 mL    OUT:    Voided (mL): 650 mL  Total OUT: 650 mL    Total NET: 80 mL        ___________________________________________________  PHYSICAL EXAM  Constitutional: Well developed, well nourished, NAD  Pulmonary: Symmetric chest rise bilaterally, no increased WOB  Gastrointestinal: Abdomen soft, nontender, nondistended  Extremities: Warm to touch, soft, normal strength, sensory and motor intact. No cyanosis/erythema/edema/hematoma  ___________________________________________________  LABS                        9.5    9.98  )-----------( 270      ( 25 Apr 2024 07:16 )             31.1     24 Apr 2024 06:51    137    |  101    |  11     ----------------------------<  112    4.2     |  25     |  0.88     Ca    9.1        24 Apr 2024 06:51      PT/INR - ( 24 Apr 2024 07:02 )   PT: 11.7 sec;   INR: 1.07 ratio         PTT - ( 24 Apr 2024 07:02 )  PTT:28.4 sec  CAPILLARY BLOOD GLUCOSE      POCT Blood Glucose.: 147 mg/dL (25 Apr 2024 21:11)  POCT Blood Glucose.: 155 mg/dL (25 Apr 2024 17:22)  POCT Blood Glucose.: 185 mg/dL (25 Apr 2024 11:33)  POCT Blood Glucose.: 120 mg/dL (25 Apr 2024 08:03)        Urinalysis Basic - ( 24 Apr 2024 06:51 )    Color: x / Appearance: x / SG: x / pH: x  Gluc: 112 mg/dL / Ketone: x  / Bili: x / Urobili: x   Blood: x / Protein: x / Nitrite: x   Leuk Esterase: x / RBC: x / WBC x   Sq Epi: x / Non Sq Epi: x / Bacteria: x      ___________________________________________________  MICRO  Recent Cultures:  Specimen Source: Clean Catch Clean Catch (Midstream), 04-22 @ 22:41; Results   <10,000 CFU/mL Normal Urogenital Genesis; Gram Stain: --; Organism: --    ___________________________________________________  MEDICATIONS  (STANDING):  amLODIPine   Tablet 5 milliGRAM(s) Oral daily  dextrose 10% Bolus 125 milliLiter(s) IV Bolus once  dextrose 5%. 1000 milliLiter(s) (100 mL/Hr) IV Continuous <Continuous>  dextrose 5%. 1000 milliLiter(s) (50 mL/Hr) IV Continuous <Continuous>  dextrose 50% Injectable 12.5 Gram(s) IV Push once  dextrose 50% Injectable 25 Gram(s) IV Push once  glucagon  Injectable 1 milliGRAM(s) IntraMuscular once  insulin lispro (ADMELOG) corrective regimen sliding scale   SubCutaneous three times a day before meals  insulin lispro (ADMELOG) corrective regimen sliding scale   SubCutaneous at bedtime  sodium chloride 0.9%. 500 milliLiter(s) (30 mL/Hr) IV Continuous <Continuous>    MEDICATIONS  (PRN):  acetaminophen     Tablet .. 650 milliGRAM(s) Oral every 6 hours PRN Temp greater or equal to 38C (100.4F), Mild Pain (1 - 3)  aluminum hydroxide/magnesium hydroxide/simethicone Suspension 30 milliLiter(s) Oral every 4 hours PRN Dyspepsia  dextrose Oral Gel 15 Gram(s) Oral once PRN Blood Glucose LESS THAN 70 milliGRAM(s)/deciliter  melatonin 3 milliGRAM(s) Oral at bedtime PRN Insomnia  ondansetron Injectable 4 milliGRAM(s) IV Push every 8 hours PRN Nausea and/or Vomiting   SUBJECTIVE  NAEO. One time BP to 178/67 otherwise HDS. Pt comfortable in bed. Tolerating diet, having regular bm. Denies N/V/abdominal pain. Still having bloody bowel movements. Endorses dizziness on walking around.    OBJECTIVE  ___________________________________________________  VITAL SIGNS / I&O's   Vital Signs Last 24 Hrs  T(C): 37.7 (26 Apr 2024 04:58), Max: 37.7 (26 Apr 2024 04:58)  T(F): 99.8 (26 Apr 2024 04:58), Max: 99.8 (26 Apr 2024 04:58)  HR: 75 (26 Apr 2024 04:58) (61 - 82)  BP: 125/62 (26 Apr 2024 04:58) (122/63 - 178/67)  BP(mean): --  RR: 18 (26 Apr 2024 04:58) (18 - 18)  SpO2: 97% (26 Apr 2024 04:58) (95% - 98%)    Parameters below as of 26 Apr 2024 04:58  Patient On (Oxygen Delivery Method): room air          24 Apr 2024 07:01  -  25 Apr 2024 07:00  --------------------------------------------------------  IN:  Total IN: 0 mL    OUT:    Voided (mL): 900 mL  Total OUT: 900 mL    Total NET: -900 mL      25 Apr 2024 07:01  -  26 Apr 2024 05:39  --------------------------------------------------------  IN:    Oral Fluid: 730 mL  Total IN: 730 mL    OUT:    Voided (mL): 650 mL  Total OUT: 650 mL    Total NET: 80 mL        ___________________________________________________  PHYSICAL EXAM  Constitutional: Well developed, well nourished, NAD  Pulmonary: Symmetric chest rise bilaterally, no increased WOB  Gastrointestinal: Abdomen soft, nontender, nondistended  Extremities: Warm to touch, soft, normal strength, sensory and motor intact. No cyanosis/erythema/edema/hematoma  ___________________________________________________  LABS                        9.5    9.98  )-----------( 270      ( 25 Apr 2024 07:16 )             31.1     24 Apr 2024 06:51    137    |  101    |  11     ----------------------------<  112    4.2     |  25     |  0.88     Ca    9.1        24 Apr 2024 06:51      PT/INR - ( 24 Apr 2024 07:02 )   PT: 11.7 sec;   INR: 1.07 ratio         PTT - ( 24 Apr 2024 07:02 )  PTT:28.4 sec  CAPILLARY BLOOD GLUCOSE      POCT Blood Glucose.: 147 mg/dL (25 Apr 2024 21:11)  POCT Blood Glucose.: 155 mg/dL (25 Apr 2024 17:22)  POCT Blood Glucose.: 185 mg/dL (25 Apr 2024 11:33)  POCT Blood Glucose.: 120 mg/dL (25 Apr 2024 08:03)        Urinalysis Basic - ( 24 Apr 2024 06:51 )    Color: x / Appearance: x / SG: x / pH: x  Gluc: 112 mg/dL / Ketone: x  / Bili: x / Urobili: x   Blood: x / Protein: x / Nitrite: x   Leuk Esterase: x / RBC: x / WBC x   Sq Epi: x / Non Sq Epi: x / Bacteria: x      ___________________________________________________  MICRO  Recent Cultures:  Specimen Source: Clean Catch Clean Catch (Midstream), 04-22 @ 22:41; Results   <10,000 CFU/mL Normal Urogenital Genesis; Gram Stain: --; Organism: --    ___________________________________________________  MEDICATIONS  (STANDING):  amLODIPine   Tablet 5 milliGRAM(s) Oral daily  dextrose 10% Bolus 125 milliLiter(s) IV Bolus once  dextrose 5%. 1000 milliLiter(s) (100 mL/Hr) IV Continuous <Continuous>  dextrose 5%. 1000 milliLiter(s) (50 mL/Hr) IV Continuous <Continuous>  dextrose 50% Injectable 12.5 Gram(s) IV Push once  dextrose 50% Injectable 25 Gram(s) IV Push once  glucagon  Injectable 1 milliGRAM(s) IntraMuscular once  insulin lispro (ADMELOG) corrective regimen sliding scale   SubCutaneous three times a day before meals  insulin lispro (ADMELOG) corrective regimen sliding scale   SubCutaneous at bedtime  sodium chloride 0.9%. 500 milliLiter(s) (30 mL/Hr) IV Continuous <Continuous>    MEDICATIONS  (PRN):  acetaminophen     Tablet .. 650 milliGRAM(s) Oral every 6 hours PRN Temp greater or equal to 38C (100.4F), Mild Pain (1 - 3)  aluminum hydroxide/magnesium hydroxide/simethicone Suspension 30 milliLiter(s) Oral every 4 hours PRN Dyspepsia  dextrose Oral Gel 15 Gram(s) Oral once PRN Blood Glucose LESS THAN 70 milliGRAM(s)/deciliter  melatonin 3 milliGRAM(s) Oral at bedtime PRN Insomnia  ondansetron Injectable 4 milliGRAM(s) IV Push every 8 hours PRN Nausea and/or Vomiting

## 2024-04-26 NOTE — CONSULT NOTE ADULT - SUBJECTIVE AND OBJECTIVE BOX
HPI  79 year old male with  PMH HTN, DM, prostate ca s/p RT presents with hematochezia found to have concern for colonic malignancy on CT, no liver lesions, colonoscopy revealed rectosigmoid mass 15cm from anal verge nonobstructive no synchronous lesions, general surgery planning on doing a colonic resection 4/1 and asked for urology to be present for BL ureteral catheters.     Patient was diagnosed with prostate cancer in [place] and had radiation 1 year ago, reports urologist checked PSA recently and levels were undetectable. UA and urine cx negative. Denies any urologic symptoms.     PAST MEDICAL & SURGICAL HISTORY:  Hypertension      Hyperlipidemia      Diabetes mellitus      Prostate cancer          MEDICATIONS  (STANDING):  amLODIPine   Tablet 5 milliGRAM(s) Oral daily  dextrose 10% Bolus 125 milliLiter(s) IV Bolus once  dextrose 5%. 1000 milliLiter(s) (50 mL/Hr) IV Continuous <Continuous>  dextrose 5%. 1000 milliLiter(s) (100 mL/Hr) IV Continuous <Continuous>  dextrose 50% Injectable 12.5 Gram(s) IV Push once  dextrose 50% Injectable 25 Gram(s) IV Push once  glucagon  Injectable 1 milliGRAM(s) IntraMuscular once  insulin lispro (ADMELOG) corrective regimen sliding scale   SubCutaneous three times a day before meals  insulin lispro (ADMELOG) corrective regimen sliding scale   SubCutaneous at bedtime  sodium chloride 0.9%. 500 milliLiter(s) (30 mL/Hr) IV Continuous <Continuous>    MEDICATIONS  (PRN):  acetaminophen     Tablet .. 650 milliGRAM(s) Oral every 6 hours PRN Temp greater or equal to 38C (100.4F), Mild Pain (1 - 3)  aluminum hydroxide/magnesium hydroxide/simethicone Suspension 30 milliLiter(s) Oral every 4 hours PRN Dyspepsia  dextrose Oral Gel 15 Gram(s) Oral once PRN Blood Glucose LESS THAN 70 milliGRAM(s)/deciliter  melatonin 3 milliGRAM(s) Oral at bedtime PRN Insomnia  ondansetron Injectable 4 milliGRAM(s) IV Push every 8 hours PRN Nausea and/or Vomiting      FAMILY HISTORY:      Allergies    No Known Allergies    Intolerances        SOCIAL HISTORY:    REVIEW OF SYSTEMS: Otherwise negative as stated in HPI    Physical Exam  Vital signs  T(C): 36.9 (04-26-24 @ 12:53), Max: 37.7 (04-26-24 @ 04:58)  HR: 88 (04-26-24 @ 12:53)  BP: 158/62 (04-26-24 @ 12:53)  SpO2: 97% (04-26-24 @ 12:53)  Wt(kg): --    Output    OUT:    Voided (mL): 650 mL  Total OUT: 650 mL    Total NET: -650 mL          Gen: NAD  Pulm: No respiratory distress	  CV: RRR  GI: S/ND/NT  : voiding spontaneously     LABS:      04-26 @ 06:58    WBC 8.20  / Hct 32.6  / SCr 0.93     04-25 @ 07:16    WBC 9.98  / Hct 31.1  / SCr --       04-26    138  |  103  |  16  ----------------------------<  122<H>  4.4   |  24  |  0.93    Ca    9.3      26 Apr 2024 06:58        Urinalysis Basic - ( 26 Apr 2024 06:58 )    Color: x / Appearance: x / SG: x / pH: x  Gluc: 122 mg/dL / Ketone: x  / Bili: x / Urobili: x   Blood: x / Protein: x / Nitrite: x   Leuk Esterase: x / RBC: x / WBC x   Sq Epi: x / Non Sq Epi: x / Bacteria: x            Urine Cx: negative    RADIOLOGY:  < from: CT Abdomen and Pelvis w/ IV Cont (04.22.24 @ 21:20) >    ACC: [id number] EXAM:  CT ABDOMEN AND PELVIS IC   ORDERED BY:  AL LEMUS     PROCEDURE DATE:  04/22/2024          INTERPRETATION:  CLINICAL INFORMATION: Bloating. History of prostate   cancer    COMPARISON: None.    CONTRAST/COMPLICATIONS:  IVContrast: Omnipaque 350  90 cc administered   10 cc discarded  Oral Contrast: NONE  Complications: None reported at time of study completion    PROCEDURE:  CT of the Abdomen and Pelvis was performed.  Precontrast, Arterial and Delayed phases were performed.  Sagittal and coronal reformats were performed.    FINDINGS:  LOWER CHEST: Within normal limits.    LIVER: Within normal limits.  BILE DUCTS: Normal caliber.  GALLBLADDER: Within normal limits.  SPLEEN: Within normal limits.  PANCREAS: Within normal limits.  ADRENALS: Within normal limits.  KIDNEYS/URETERS: Subcentimeter right renal nonobstructing calcification.   Right renal cyst. Bilateral subcentimeter cortical hypodensities too   small to characterize. No hydronephrosis.    BLADDER: Within normal limits.  REPRODUCTIVE ORGANS: Prostate is enlarged.    BOWEL: There is no active bleed. There is a short segment of mucosal   thickening with enhancement at the distal sigmoid colon (303:95). This is   indeterminant. Malignancy is not excluded. Recommend colonoscopy. No   bowel obstruction. Appendix is normal.  PERITONEUM: No ascites.  VESSELS: Atherosclerotic changes.  RETROPERITONEUM/LYMPH NODES: No lymphadenopathy.  ABDOMINAL WALL: 6 cm intramuscular lipoma within the left obturator  muscles.  BONES: Degenerative changes.    IMPRESSION:    There is no active bleed. There is a short segment of mucosal thickening   with enhancement at the distal sigmoid colon (303:95). This is   indeterminant. Malignancy is not excluded. Recommend colonoscopy.    < end of copied text >      
Surgery Consult Note  Attending: Stephani   Service: [facility] #[id number]   p      HPI: 79yMale PMH HTN, DM, prostate ca s/p RT admitted for hematochezia and concern for possible colonic malignancy on CTAP. Patient reports some BRBPRx3 prior to admission. Per chart review he had previously had c-scope concerning for malignancy in [place] but did not follow up at that time. GI was consulted and took patient for colonscopy today whic h revealed rectosigmoid mass 15cm from anal verge, no synchronous lesions, partially obstructing. Biopsies were taken. Patient is tolerating a diet and having consistent bowel function. Denies fevers, chills, chest pain, dyspnea, headache, nausea, emesis.       PAST MEDICAL HISTORY:  PAST MEDICAL & SURGICAL HISTORY:  Hypertension      Hyperlipidemia      Diabetes mellitus      Prostate cancer          ALLERGIES:  Allergies    No Known Allergies    Intolerances        SOCIAL HISTORY: Negative for tobacco, etoh, or drug use    FAMILY HISTORY:  FAMILY HISTORY:      PHYSICAL EXAM:  General: NAD, resting comfortably  HEENT: NC/AT, EOMI, normal hearing, no oral lesions, no LAD, neck supple  Pulmonary: normal resp effort, CTA-B  Cardiovascular: NSR, no murmurs  Abdominal: soft, ND/NT, no organomegaly, no guarding or rebound tenderness  GARCIA: no blood on finger, no mass palpated  Extremities: WWP, normal strength, no clubbing/cyanosis/edema  Neuro: A/O x 3, CNs II-XII grossly intact, normal sensation, no focal deficits      VITAL SIGNS:  Vital Signs Last 24 Hrs  T(C): 37.2 (24 Apr 2024 16:05), Max: 37.2 (24 Apr 2024 12:47)  T(F): 99 (24 Apr 2024 16:05), Max: 99 (24 Apr 2024 16:05)  HR: 74 (24 Apr 2024 16:05) (67 - 85)  BP: 160/74 (24 Apr 2024 16:05) (103/61 - 187/89)  BP(mean): 93 (24 Apr 2024 09:12) (93 - 93)  RR: 18 (24 Apr 2024 16:05) (15 - 21)  SpO2: 96% (24 Apr 2024 16:05) (94% - 100%)    Parameters below as of 24 Apr 2024 16:05  Patient On (Oxygen Delivery Method): room air        I&O's Summary    23 Apr 2024 07:01  -  24 Apr 2024 07:00  --------------------------------------------------------  IN: 0 mL / OUT: 300 mL / NET: -300 mL        LABS:                        9.8    6.62  )-----------( 252      ( 24 Apr 2024 07:02 )             30.8     04-24    137  |  101  |  11  ----------------------------<  112<H>  4.2   |  25  |  0.88    Ca    9.1      24 Apr 2024 06:51  Phos  3.5     04-23  Mg     2.0     04-23      PT/INR - ( 24 Apr 2024 07:02 )   PT: 11.7 sec;   INR: 1.07 ratio         PTT - ( 24 Apr 2024 07:02 )  PTT:28.4 sec  Urinalysis Basic - ( 24 Apr 2024 06:51 )    Color: x / Appearance: x / SG: x / pH: x  Gluc: 112 mg/dL / Ketone: x  / Bili: x / Urobili: x   Blood: x / Protein: x / Nitrite: x   Leuk Esterase: x / RBC: x / WBC x   Sq Epi: x / Non Sq Epi: x / Bacteria: x      CAPILLARY BLOOD GLUCOSE      POCT Blood Glucose.: 139 mg/dL (24 Apr 2024 17:05)  POCT Blood Glucose.: 125 mg/dL (24 Apr 2024 11:52)  POCT Blood Glucose.: 119 mg/dL (24 Apr 2024 05:39)  POCT Blood Glucose.: 116 mg/dL (23 Apr 2024 22:20)        CULTURES:  Culture Results:   <10,000 CFU/mL Normal Urogenital Genesis (04-22 @ 22:41)      RADIOLOGY & ADDITIONAL STUDIES:    < from: CT Abdomen and Pelvis w/ IV Cont (04.22.24 @ 21:20) >    ACC: [id number] EXAM:  CT ABDOMEN AND PELVIS IC   ORDERED BY:  AL LEMUS     PROCEDURE DATE:  04/22/2024          INTERPRETATION:  CLINICAL INFORMATION: Bloating. History of prostate   cancer    COMPARISON: None.    CONTRAST/COMPLICATIONS:  IVContrast: Omnipaque 350  90 cc administered   10 cc discarded  Oral Contrast: NONE  Complications: None reported at time of study completion    PROCEDURE:  CT of the Abdomen and Pelvis was performed.  Precontrast, Arterial and Delayed phases were performed.  Sagittal and coronal reformats were performed.    FINDINGS:  LOWER CHEST: Within normal limits.    LIVER: Within normal limits.  BILE DUCTS: Normal caliber.  GALLBLADDER: Within normal limits.  SPLEEN: Within normal limits.  PANCREAS: Within normal limits.  ADRENALS: Within normal limits.  KIDNEYS/URETERS: Subcentimeter right renal nonobstructing calcification.   Right renal cyst. Bilateral subcentimeter cortical hypodensities too   small to characterize. No hydronephrosis.    BLADDER: Within normal limits.  REPRODUCTIVE ORGANS: Prostate is enlarged.    BOWEL: There is no active bleed. There is a short segment of mucosal   thickening with enhancement at the distal sigmoid colon (303:95). This is   indeterminant. Malignancy is not excluded. Recommend colonoscopy. No   bowel obstruction. Appendix is normal.  PERITONEUM: No ascites.  VESSELS: Atherosclerotic changes.  RETROPERITONEUM/LYMPH NODES: No lymphadenopathy.  ABDOMINAL WALL: 6 cm intramuscular lipoma within the left obturator  muscles.  BONES: Degenerative changes.    IMPRESSION:    There is no active bleed. There is a short segment of mucosal thickening   with enhancement at the distal sigmoid colon (303:95). This is   indeterminant. Malignancy is not excluded. Recommend colonoscopy.      --- End of Report ---        < end of copied text >  < from: Colonoscopy (04.24.24 @ 08:47) >                                                                                                       Findings:       A fungating partially obstructing large mass was found in the recto-sigmoid colon. The mass        was partially circumferential (involving two-thirds of the lumen circumference), the        colonoscope was able to easily traverse the lesion The mass measured five cm in length        (extending from 20 cm from the anus to 15 cm from the anus). Oozing was present. Multiple        biopsies were taken with a ashley cold forceps for histology.       The areas proximal and distal to the mass were tattooed with an injection of 5 mL of carbon        black.       A few small-mouthed diverticula were found in the sigmoid colon.       A few medium-sized localized angiodysplastic lesions without bleeding were found in the        rectum.       The exam was otherwise normal throughout the examined colon.                                    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 Extending from 20 cm from the anus to 15 cm from the   anal verge. Biopsied. Tattooed. Likely the source of recent hematochezia.                       - Diverticulosis in the sigmoid colon.                       - Non-bleeding angioectasia in the rectum, likely secondary to prior                        radiation therapy.    < end of copied text >              
HPI:  NEHEMIAS GARCIA is a 79year old male with HTN, T2DM, h/o prostate ca s/p RT. He's currently visiting from [place] and came to ED for BRBPR [id number] in the past few days.     Patient reports prior episode of hematochezia after completing RT for prostate cancer (finished 2022), and no prior colonoscopy. This time, hematochezia started a few days ago, in small amount, and increase in the past a couple of days. Last episode was last night. (+) mild abdominal pain in the past few days that improved today.   No family hx of GI-related cancer. Still on injection therapy for prostate cancer, last dose will be June.      Otherwise, patient denies fevers, chills, dysphagia, odynophagia, nausea, vomiting, diarrhea, melena, hematemesis.      PMHX/PSHX:    Hypertension    Hyperlipidemia    Diabetes mellitus    Prostate cancer      Allergies:  No Known Allergies      Home Medications: reviewed  Hospital Medications:  acetaminophen     Tablet .. 650 milliGRAM(s) Oral every 6 hours PRN  aluminum hydroxide/magnesium hydroxide/simethicone Suspension 30 milliLiter(s) Oral every 4 hours PRN  amLODIPine   Tablet 5 milliGRAM(s) Oral daily  dextrose 10% Bolus 125 milliLiter(s) IV Bolus once  dextrose 5%. 1000 milliLiter(s) IV Continuous <Continuous>  dextrose 5%. 1000 milliLiter(s) IV Continuous <Continuous>  dextrose 50% Injectable 12.5 Gram(s) IV Push once  dextrose 50% Injectable 25 Gram(s) IV Push once  dextrose Oral Gel 15 Gram(s) Oral once PRN  glucagon  Injectable 1 milliGRAM(s) IntraMuscular once  insulin lispro (ADMELOG) corrective regimen sliding scale   SubCutaneous three times a day before meals  insulin lispro (ADMELOG) corrective regimen sliding scale   SubCutaneous at bedtime  melatonin 3 milliGRAM(s) Oral at bedtime PRN  ondansetron Injectable 4 milliGRAM(s) IV Push every 8 hours PRN      Social History:   Tobacco: denies  Alcohol: Social, previously drinking more  Recreational drugs: denies      PHYSICAL EXAM:   Vital Signs:  Vital Signs Last 24 Hrs  T(C): 37.1 (23 Apr 2024 04:53), Max: 37.1 (23 Apr 2024 04:53)  T(F): 98.8 (23 Apr 2024 04:53), Max: 98.8 (23 Apr 2024 04:53)  HR: 67 (23 Apr 2024 04:53) (56 - 81)  BP: 101/57 (23 Apr 2024 04:53) (101/57 - 181/83)  BP(mean): 93 (23 Apr 2024 01:06) (93 - 114)  RR: 18 (23 Apr 2024 04:53) (15 - 18)  SpO2: 98% (23 Apr 2024 04:53) (96% - 98%)    Parameters below as of 23 Apr 2024 04:53  Patient On (Oxygen Delivery Method): room air      Daily Height in cm: 162.56 (22 Apr 2024 15:20)    Daily     GENERAL: no acute distress  NEURO: alert and oriented x 3  HEENT: NCAT, no conjunctival pallor appreciated; anicteric sclera  CHEST: no respiratory distress, no accessory muscle use  CARDIAC: regular rate, +S1/S2  ABDOMEN: soft, mild diffuse tenderness, no rebound or guarding; rectal exam performed with ED RN: Maroon-colored stool  EXTREMITIES: warm, well perfused  SKIN: no lesions noted    LABS: reviewed                        9.6    7.38  )-----------( 246      ( 23 Apr 2024 06:48 )             31.0     04-23    141  |  105  |  16  ----------------------------<  104<H>  3.9   |  24  |  0.85    Ca    9.2      23 Apr 2024 06:48  Phos  3.5     04-23  Mg     2.0     04-23    TPro  7.5  /  Alb  4.3  /  TBili  0.2  /  DBili  x   /  AST  50<H>  /  ALT  34  /  AlkPhos  78  04-22    LIVER FUNCTIONS - ( 22 Apr 2024 18:07 )  Alb: 4.3 g/dL / Pro: 7.5 g/dL / ALK PHOS: 78 U/L / ALT: 34 U/L / AST: 50 U/L / GGT: x             < from: CT Abdomen and Pelvis w/ IV Cont (04.22.24 @ 21:20) >  FINDINGS:  LOWER CHEST: Within normal limits.    LIVER: Within normal limits.  BILE DUCTS: Normal caliber.  GALLBLADDER: Within normal limits.  SPLEEN: Within normal limits.  PANCREAS: Within normal limits.  ADRENALS: Within normal limits.  KIDNEYS/URETERS: Subcentimeter right renal nonobstructing calcification.   Right renal cyst. Bilateral subcentimeter cortical hypodensities too   small to characterize. No hydronephrosis.    BLADDER: Within normal limits.  REPRODUCTIVE ORGANS: Prostate is enlarged.    BOWEL: There is no active bleed. There is a short segment of mucosal   thickening with enhancement at the distal sigmoid colon (303:95). This is   indeterminant. Malignancy is not excluded. Recommend colonoscopy. No   bowel obstruction. Appendix is normal.  PERITONEUM: No ascites.  VESSELS: Atherosclerotic changes.  RETROPERITONEUM/LYMPH NODES: No lymphadenopathy.  ABDOMINAL WALL: 6 cm intramuscular lipoma within the left obturator  muscles.  BONES: Degenerative changes.    IMPRESSION:    There is no active bleed. There is a short segment of mucosal thickening   with enhancement at the distal sigmoid colon (303:95). This is   indeterminant. Malignancy is not excluded. Recommend colonoscopy.      --- End of Report ---    < end of copied text >

## 2024-04-26 NOTE — PROGRESS NOTE ADULT - SUBJECTIVE AND OBJECTIVE BOX
Bryon Dodge MD  Division of Hospital Medicine  Available via MS teams  ---------------------------------------------------------    NEHEMIAS GARCIA  79y  Male      Patient is a 79y old  Male who presents with a chief complaint of     INTERVAL HPI/OVERNIGHT EVENTS:  INCOMPLETE      REVIEW OF SYSTEMS: 10 point ROS negative unless listed above    T(C): 37.7 (04-26-24 @ 04:58), Max: 37.7 (04-26-24 @ 04:58)  HR: 75 (04-26-24 @ 04:58) (61 - 82)  BP: 125/62 (04-26-24 @ 04:58) (122/63 - 178/67)  RR: 18 (04-26-24 @ 04:58) (18 - 18)  SpO2: 97% (04-26-24 @ 04:58) (95% - 98%)  Wt(kg): --Vital Signs Last 24 Hrs  T(C): 37.7 (26 Apr 2024 04:58), Max: 37.7 (26 Apr 2024 04:58)  T(F): 99.8 (26 Apr 2024 04:58), Max: 99.8 (26 Apr 2024 04:58)  HR: 75 (26 Apr 2024 04:58) (61 - 82)  BP: 125/62 (26 Apr 2024 04:58) (122/63 - 178/67)  BP(mean): --  RR: 18 (26 Apr 2024 04:58) (18 - 18)  SpO2: 97% (26 Apr 2024 04:58) (95% - 98%)    Parameters below as of 26 Apr 2024 04:58  Patient On (Oxygen Delivery Method): room air        PHYSICAL EXAM:  GENERAL: NAD, well-groomed, well-developed  NECK: Supple, No JVD  CHEST/LUNG: Clear to auscultation bilaterally; No rales, rhonchi, wheezing, or rubs  HEART: Regular rate and rhythm; No murmurs, rubs, or gallops  ABDOMEN: Soft, Nontender, Nondistended; Bowel sounds present.    EXTREMITIES:  2+ Peripheral Pulses, No clubbing, cyanosis, or edema  PSYCH: Alert & Oriented x3          LABS:                        9.5    9.98  )-----------( 270      ( 25 Apr 2024 07:16 )             31.1               CAPILLARY BLOOD GLUCOSE      POCT Blood Glucose.: 147 mg/dL (25 Apr 2024 21:11)  POCT Blood Glucose.: 155 mg/dL (25 Apr 2024 17:22)  POCT Blood Glucose.: 185 mg/dL (25 Apr 2024 11:33)  POCT Blood Glucose.: 120 mg/dL (25 Apr 2024 08:03)            RADIOLOGY & ADDITIONAL TESTS:    Imaging Personally Reviewed:  [ ] YES  [ ] NO Bryon Dodge MD  Division of Hospital Medicine  Available via MS teams  ---------------------------------------------------------    NEHEMIAS GARCIA  79y  Male      Patient is a 79y old  Male who presents with a chief complaint of     INTERVAL HPI/OVERNIGHT EVENTS:  Seen at bedside. had some BRBPR.       REVIEW OF SYSTEMS: 10 point ROS negative unless listed above    T(C): 37.7 (04-26-24 @ 04:58), Max: 37.7 (04-26-24 @ 04:58)  HR: 75 (04-26-24 @ 04:58) (61 - 82)  BP: 125/62 (04-26-24 @ 04:58) (122/63 - 178/67)  RR: 18 (04-26-24 @ 04:58) (18 - 18)  SpO2: 97% (04-26-24 @ 04:58) (95% - 98%)  Wt(kg): --Vital Signs Last 24 Hrs  T(C): 37.7 (26 Apr 2024 04:58), Max: 37.7 (26 Apr 2024 04:58)  T(F): 99.8 (26 Apr 2024 04:58), Max: 99.8 (26 Apr 2024 04:58)  HR: 75 (26 Apr 2024 04:58) (61 - 82)  BP: 125/62 (26 Apr 2024 04:58) (122/63 - 178/67)  BP(mean): --  RR: 18 (26 Apr 2024 04:58) (18 - 18)  SpO2: 97% (26 Apr 2024 04:58) (95% - 98%)    Parameters below as of 26 Apr 2024 04:58  Patient On (Oxygen Delivery Method): room air        PHYSICAL EXAM:  GENERAL: NAD, well-groomed, well-developed  NECK: Supple, No JVD  CHEST/LUNG: Clear to auscultation bilaterally; No rales, rhonchi, wheezing, or rubs  HEART: Regular rate and rhythm; No murmurs, rubs, or gallops  ABDOMEN: Soft, Nontender, Nondistended; Bowel sounds present.    EXTREMITIES:  2+ Peripheral Pulses, No clubbing, cyanosis, or edema  PSYCH: Alert & Oriented x3          LABS:                        9.5    9.98  )-----------( 270      ( 25 Apr 2024 07:16 )             31.1               CAPILLARY BLOOD GLUCOSE      POCT Blood Glucose.: 147 mg/dL (25 Apr 2024 21:11)  POCT Blood Glucose.: 155 mg/dL (25 Apr 2024 17:22)  POCT Blood Glucose.: 185 mg/dL (25 Apr 2024 11:33)  POCT Blood Glucose.: 120 mg/dL (25 Apr 2024 08:03)            RADIOLOGY & ADDITIONAL TESTS:    Imaging Personally Reviewed:  [ ] YES  [ ] NO

## 2024-04-27 DIAGNOSIS — Z01.818 ENCOUNTER FOR OTHER PREPROCEDURAL EXAMINATION: ICD-10-CM

## 2024-04-27 LAB
AFP-TM SERPL-MCNC: 2.5 NG/ML — SIGNIFICANT CHANGE UP
GLUCOSE BLDC GLUCOMTR-MCNC: 124 MG/DL — HIGH (ref 70–99)
GLUCOSE BLDC GLUCOMTR-MCNC: 134 MG/DL — HIGH (ref 70–99)
GLUCOSE BLDC GLUCOMTR-MCNC: 138 MG/DL — HIGH (ref 70–99)
GLUCOSE BLDC GLUCOMTR-MCNC: 146 MG/DL — HIGH (ref 70–99)
HCT VFR BLD CALC: 31.9 % — LOW (ref 39–50)
HGB BLD-MCNC: 9.8 G/DL — LOW (ref 13–17)
MCHC RBC-ENTMCNC: 25.9 PG — LOW (ref 27–34)
MCHC RBC-ENTMCNC: 30.7 GM/DL — LOW (ref 32–36)
MCV RBC AUTO: 84.2 FL — SIGNIFICANT CHANGE UP (ref 80–100)
NRBC # BLD: 0 /100 WBCS — SIGNIFICANT CHANGE UP (ref 0–0)
PLATELET # BLD AUTO: 281 K/UL — SIGNIFICANT CHANGE UP (ref 150–400)
RBC # BLD: 3.79 M/UL — LOW (ref 4.2–5.8)
RBC # FLD: 13.7 % — SIGNIFICANT CHANGE UP (ref 10.3–14.5)
WBC # BLD: 8.14 K/UL — SIGNIFICANT CHANGE UP (ref 3.8–10.5)
WBC # FLD AUTO: 8.14 K/UL — SIGNIFICANT CHANGE UP (ref 3.8–10.5)

## 2024-04-27 PROCEDURE — 99232 SBSQ HOSP IP/OBS MODERATE 35: CPT

## 2024-04-27 RX ADMIN — AMLODIPINE BESYLATE 5 MILLIGRAM(S): 2.5 TABLET ORAL at 05:19

## 2024-04-27 NOTE — PROGRESS NOTE ADULT - SUBJECTIVE AND OBJECTIVE BOX
Bryon Dodge MD  Division of Hospital Medicine  Available via MS teams  ---------------------------------------------------------    NEHEMIAS GARCIA  79y  Male      Patient is a 79y old  Male who presents with a chief complaint of     INTERVAL HPI/OVERNIGHT EVENTS:  INCOMPLETE      REVIEW OF SYSTEMS: 10 point ROS negative unless listed above    T(C): 37.1 (04-27-24 @ 05:27), Max: 37.3 (04-26-24 @ 21:48)  HR: 67 (04-27-24 @ 05:27) (51 - 90)  BP: 126/68 (04-27-24 @ 05:27) (122/66 - 164/81)  RR: 18 (04-27-24 @ 05:27) (18 - 18)  SpO2: 98% (04-27-24 @ 05:27) (94% - 99%)  Wt(kg): --Vital Signs Last 24 Hrs  T(C): 37.1 (27 Apr 2024 05:27), Max: 37.3 (26 Apr 2024 21:48)  T(F): 98.8 (27 Apr 2024 05:27), Max: 99.2 (26 Apr 2024 21:48)  HR: 67 (27 Apr 2024 05:27) (51 - 90)  BP: 126/68 (27 Apr 2024 05:27) (122/66 - 164/81)  BP(mean): --  RR: 18 (27 Apr 2024 05:27) (18 - 18)  SpO2: 98% (27 Apr 2024 05:27) (94% - 99%)    Parameters below as of 27 Apr 2024 05:27  Patient On (Oxygen Delivery Method): room air        PHYSICAL EXAM:  GENERAL: NAD, well-groomed, well-developed  NECK: Supple, No JVD  CHEST/LUNG: Clear to auscultation bilaterally; No rales, rhonchi, wheezing, or rubs  HEART: Regular rate and rhythm; No murmurs, rubs, or gallops  ABDOMEN: Soft, Nontender, Nondistended; Bowel sounds present.    EXTREMITIES:  2+ Peripheral Pulses, No clubbing, cyanosis, or edema  PSYCH: Alert & Oriented x3          LABS:                        10.1   8.20  )-----------( 292      ( 26 Apr 2024 06:58 )             32.6     04-26    138  |  103  |  16  ----------------------------<  122<H>  4.4   |  24  |  0.93    Ca    9.3      26 Apr 2024 06:58        Urinalysis Basic - ( 26 Apr 2024 06:58 )    Color: x / Appearance: x / SG: x / pH: x  Gluc: 122 mg/dL / Ketone: x  / Bili: x / Urobili: x   Blood: x / Protein: x / Nitrite: x   Leuk Esterase: x / RBC: x / WBC x   Sq Epi: x / Non Sq Epi: x / Bacteria: x      CAPILLARY BLOOD GLUCOSE      POCT Blood Glucose.: 165 mg/dL (26 Apr 2024 21:25)  POCT Blood Glucose.: 191 mg/dL (26 Apr 2024 17:13)  POCT Blood Glucose.: 167 mg/dL (26 Apr 2024 12:21)  POCT Blood Glucose.: 122 mg/dL (26 Apr 2024 08:28)        Urinalysis Basic - ( 26 Apr 2024 06:58 )    Color: x / Appearance: x / SG: x / pH: x  Gluc: 122 mg/dL / Ketone: x  / Bili: x / Urobili: x   Blood: x / Protein: x / Nitrite: x   Leuk Esterase: x / RBC: x / WBC x   Sq Epi: x / Non Sq Epi: x / Bacteria: x        RADIOLOGY & ADDITIONAL TESTS:    Imaging Personally Reviewed:  [ ] YES  [ ] NO Bryon Dodge MD  Division of Hospital Medicine  Available via MS teams  ---------------------------------------------------------    NEHEMIAS GARCIA  79y  Male      Patient is a 79y old  Male who presents with a chief complaint of     INTERVAL HPI/OVERNIGHT EVENTS:  Seen at bedside. Still with occasional BRBPR      REVIEW OF SYSTEMS: 10 point ROS negative unless listed above    T(C): 37.1 (04-27-24 @ 05:27), Max: 37.3 (04-26-24 @ 21:48)  HR: 67 (04-27-24 @ 05:27) (51 - 90)  BP: 126/68 (04-27-24 @ 05:27) (122/66 - 164/81)  RR: 18 (04-27-24 @ 05:27) (18 - 18)  SpO2: 98% (04-27-24 @ 05:27) (94% - 99%)  Wt(kg): --Vital Signs Last 24 Hrs  T(C): 37.1 (27 Apr 2024 05:27), Max: 37.3 (26 Apr 2024 21:48)  T(F): 98.8 (27 Apr 2024 05:27), Max: 99.2 (26 Apr 2024 21:48)  HR: 67 (27 Apr 2024 05:27) (51 - 90)  BP: 126/68 (27 Apr 2024 05:27) (122/66 - 164/81)  BP(mean): --  RR: 18 (27 Apr 2024 05:27) (18 - 18)  SpO2: 98% (27 Apr 2024 05:27) (94% - 99%)    Parameters below as of 27 Apr 2024 05:27  Patient On (Oxygen Delivery Method): room air        PHYSICAL EXAM:  GENERAL: NAD, well-groomed, well-developed  NECK: Supple, No JVD  CHEST/LUNG: Clear to auscultation bilaterally; No rales, rhonchi, wheezing, or rubs  HEART: Regular rate and rhythm; No murmurs, rubs, or gallops  ABDOMEN: Soft, Nontender, Nondistended; Bowel sounds present.    EXTREMITIES:  2+ Peripheral Pulses, No clubbing, cyanosis, or edema  PSYCH: Alert & Oriented x3          LABS:                        10.1   8.20  )-----------( 292      ( 26 Apr 2024 06:58 )             32.6     04-26    138  |  103  |  16  ----------------------------<  122<H>  4.4   |  24  |  0.93    Ca    9.3      26 Apr 2024 06:58        Urinalysis Basic - ( 26 Apr 2024 06:58 )    Color: x / Appearance: x / SG: x / pH: x  Gluc: 122 mg/dL / Ketone: x  / Bili: x / Urobili: x   Blood: x / Protein: x / Nitrite: x   Leuk Esterase: x / RBC: x / WBC x   Sq Epi: x / Non Sq Epi: x / Bacteria: x      CAPILLARY BLOOD GLUCOSE      POCT Blood Glucose.: 165 mg/dL (26 Apr 2024 21:25)  POCT Blood Glucose.: 191 mg/dL (26 Apr 2024 17:13)  POCT Blood Glucose.: 167 mg/dL (26 Apr 2024 12:21)  POCT Blood Glucose.: 122 mg/dL (26 Apr 2024 08:28)        Urinalysis Basic - ( 26 Apr 2024 06:58 )    Color: x / Appearance: x / SG: x / pH: x  Gluc: 122 mg/dL / Ketone: x  / Bili: x / Urobili: x   Blood: x / Protein: x / Nitrite: x   Leuk Esterase: x / RBC: x / WBC x   Sq Epi: x / Non Sq Epi: x / Bacteria: x        RADIOLOGY & ADDITIONAL TESTS:    Imaging Personally Reviewed:  [ ] YES  [ ] NO

## 2024-04-27 NOTE — PROGRESS NOTE ADULT - PROBLEM SELECTOR PLAN 2
Pt with METS>4, has RCRI of 0 indicating class I risk and 3.9% chance of 30 day MACE. TTE reviewed has EF of 57%, no wall motion abnormalities.  - Pt medically optimized for planned procedure.

## 2024-04-27 NOTE — PROGRESS NOTE ADULT - PROBLEM SELECTOR PLAN 1
CT with thickening in sigmoid colon. Biopsy confirmed moderately differentiated adenocarcinoma  - colonoscopy with partial obstructing mass in colon.   - CT chest for staging reviewed, negative for metastatic disease,  - Seen by surgery, for OR next week, 5/1  - Trend H&H and transfuse for goal hgb > 7 CT with thickening in sigmoid colon. Biopsy confirmed moderately differentiated adenocarcinoma  - colonoscopy with partial obstructing mass in colon.   - CT chest for staging reviewed, negative for metastatic disease  - Seen by surgery, for OR next week, 5/1  - Trend H&H and transfuse for goal hgb > 7

## 2024-04-28 LAB
GLUCOSE BLDC GLUCOMTR-MCNC: 125 MG/DL — HIGH (ref 70–99)
GLUCOSE BLDC GLUCOMTR-MCNC: 127 MG/DL — HIGH (ref 70–99)
GLUCOSE BLDC GLUCOMTR-MCNC: 146 MG/DL — HIGH (ref 70–99)
GLUCOSE BLDC GLUCOMTR-MCNC: 159 MG/DL — HIGH (ref 70–99)

## 2024-04-28 PROCEDURE — 99232 SBSQ HOSP IP/OBS MODERATE 35: CPT

## 2024-04-28 RX ADMIN — AMLODIPINE BESYLATE 5 MILLIGRAM(S): 2.5 TABLET ORAL at 05:07

## 2024-04-28 NOTE — PROGRESS NOTE ADULT - PROBLEM SELECTOR PLAN 1
CT with thickening in sigmoid colon. Biopsy confirmed moderately differentiated adenocarcinoma  - colonoscopy with partial obstructing mass in colon.   - CT chest for staging reviewed, negative for metastatic disease  - Seen by surgery, for OR next week, 5/1  - Trend H&H and transfuse for goal hgb > 7

## 2024-04-28 NOTE — PROGRESS NOTE ADULT - ASSESSMENT
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 No active hemorrhage from mass at this time, GARCIA without blood, no abd pain, tolerting diet and having bowel function. H/H stable. Surgery consulted for colon mass.    PLAN:  - CEA and AFP wnl  - CT chest noncon negative for mets  - Follow up pathology  - Continue monitoring hgb  - OR possibly on Wednesday  - Appreciate medical optimization documented  - Care as per primary team      [facility]  [phone]

## 2024-04-28 NOTE — PROGRESS NOTE ADULT - SUBJECTIVE AND OBJECTIVE BOX
[facility] Daily Resident Progress Note    SUBJECTIVE: Pt seen and examined at bedside. Denies chest pain, SOB, N/V, and pain.      OBJECTIVE:  Vital Signs Last 24 Hrs  T(C): 37.2 (28 Apr 2024 05:13), Max: 37.2 (27 Apr 2024 20:45)  T(F): 98.9 (28 Apr 2024 05:13), Max: 98.9 (27 Apr 2024 20:45)  HR: 70 (28 Apr 2024 05:13) (70 - 74)  BP: 128/76 (28 Apr 2024 05:13) (125/75 - 155/78)  BP(mean): --  RR: 18 (28 Apr 2024 05:13) (18 - 18)  SpO2: 99% (28 Apr 2024 05:13) (98% - 99%)    Parameters below as of 28 Apr 2024 05:13  Patient On (Oxygen Delivery Method): room air        I&O's Summary    27 Apr 2024 07:01  -  28 Apr 2024 07:00  --------------------------------------------------------  IN: 360 mL / OUT: 150 mL / NET: 210 mL        Allergies:  No Known Allergies      Medications:  MEDICATIONS  (STANDING):  amLODIPine   Tablet 5 milliGRAM(s) Oral daily  dextrose 10% Bolus 125 milliLiter(s) IV Bolus once  dextrose 5%. 1000 milliLiter(s) (100 mL/Hr) IV Continuous <Continuous>  dextrose 5%. 1000 milliLiter(s) (50 mL/Hr) IV Continuous <Continuous>  dextrose 50% Injectable 12.5 Gram(s) IV Push once  dextrose 50% Injectable 25 Gram(s) IV Push once  glucagon  Injectable 1 milliGRAM(s) IntraMuscular once  insulin lispro (ADMELOG) corrective regimen sliding scale   SubCutaneous three times a day before meals  insulin lispro (ADMELOG) corrective regimen sliding scale   SubCutaneous at bedtime  sodium chloride 0.9%. 500 milliLiter(s) (30 mL/Hr) IV Continuous <Continuous>    MEDICATIONS  (PRN):  acetaminophen     Tablet .. 650 milliGRAM(s) Oral every 6 hours PRN Temp greater or equal to 38C (100.4F), Mild Pain (1 - 3)  aluminum hydroxide/magnesium hydroxide/simethicone Suspension 30 milliLiter(s) Oral every 4 hours PRN Dyspepsia  dextrose Oral Gel 15 Gram(s) Oral once PRN Blood Glucose LESS THAN 70 milliGRAM(s)/deciliter  melatonin 3 milliGRAM(s) Oral at bedtime PRN Insomnia  ondansetron Injectable 4 milliGRAM(s) IV Push every 8 hours PRN Nausea and/or Vomiting      Physical Exam:  General Appearance: Appears well, NAD, A&Ox3  Chest: Equal expansion bilaterally, no increased WOB.   CV: RRR, WWP.   Abdomen: soft, NT/ND.  Extremities: No swelling, asymmetry, or gross deformity appreciated.     Labs:                        9.8    8.14  )-----------( 281      ( 27 Apr 2024 07:19 )             31.9               CAPILLARY BLOOD GLUCOSE      POCT Blood Glucose.: 146 mg/dL (27 Apr 2024 21:15)  POCT Blood Glucose.: 138 mg/dL (27 Apr 2024 17:30)  POCT Blood Glucose.: 124 mg/dL (27 Apr 2024 12:44)  POCT Blood Glucose.: 134 mg/dL (27 Apr 2024 08:55)      Microbiology:      Radiology & additional studies:

## 2024-04-28 NOTE — PROGRESS NOTE ADULT - SUBJECTIVE AND OBJECTIVE BOX
Bryon Dodge MD  Division of Hospital Medicine  Available via MS teams  ---------------------------------------------------------    NEHEMIAS GARCIA  79y  Male      Patient is a 79y old  Male who presents with a chief complaint of     INTERVAL HPI/OVERNIGHT EVENTS:  INCOMPLETE      REVIEW OF SYSTEMS: 10 point ROS negative unless listed above    T(C): 37.2 (04-28-24 @ 05:13), Max: 37.2 (04-27-24 @ 20:45)  HR: 70 (04-28-24 @ 05:13) (70 - 74)  BP: 128/76 (04-28-24 @ 05:13) (125/75 - 155/78)  RR: 18 (04-28-24 @ 05:13) (18 - 18)  SpO2: 99% (04-28-24 @ 05:13) (98% - 99%)  Wt(kg): --Vital Signs Last 24 Hrs  T(C): 37.2 (28 Apr 2024 05:13), Max: 37.2 (27 Apr 2024 20:45)  T(F): 98.9 (28 Apr 2024 05:13), Max: 98.9 (27 Apr 2024 20:45)  HR: 70 (28 Apr 2024 05:13) (70 - 74)  BP: 128/76 (28 Apr 2024 05:13) (125/75 - 155/78)  BP(mean): --  RR: 18 (28 Apr 2024 05:13) (18 - 18)  SpO2: 99% (28 Apr 2024 05:13) (98% - 99%)    Parameters below as of 28 Apr 2024 05:13  Patient On (Oxygen Delivery Method): room air        PHYSICAL EXAM:  GENERAL: NAD, well-groomed, well-developed  NECK: Supple, No JVD  CHEST/LUNG: Clear to auscultation bilaterally; No rales, rhonchi, wheezing, or rubs  HEART: Regular rate and rhythm; No murmurs, rubs, or gallops  ABDOMEN: Soft, Nontender, Nondistended; Bowel sounds present.    EXTREMITIES:  2+ Peripheral Pulses, No clubbing, cyanosis, or edema  PSYCH: Alert & Oriented x3            LABS:                        9.8    8.14  )-----------( 281      ( 27 Apr 2024 07:19 )             31.9               CAPILLARY BLOOD GLUCOSE      POCT Blood Glucose.: 146 mg/dL (27 Apr 2024 21:15)  POCT Blood Glucose.: 138 mg/dL (27 Apr 2024 17:30)  POCT Blood Glucose.: 124 mg/dL (27 Apr 2024 12:44)  POCT Blood Glucose.: 134 mg/dL (27 Apr 2024 08:55)            RADIOLOGY & ADDITIONAL TESTS:    Imaging Personally Reviewed:  [ ] YES  [ ] NO Bryon Dodge MD  Division of Hospital Medicine  Available via MS teams  ---------------------------------------------------------    NEHEMIAS GARCIA  79y  Male      Patient is a 79y old  Male who presents with a chief complaint of     INTERVAL HPI/OVERNIGHT EVENTS:  Seen at bedside Still with occasional BRBPR      REVIEW OF SYSTEMS: 10 point ROS negative unless listed above    T(C): 37.2 (04-28-24 @ 05:13), Max: 37.2 (04-27-24 @ 20:45)  HR: 70 (04-28-24 @ 05:13) (70 - 74)  BP: 128/76 (04-28-24 @ 05:13) (125/75 - 155/78)  RR: 18 (04-28-24 @ 05:13) (18 - 18)  SpO2: 99% (04-28-24 @ 05:13) (98% - 99%)  Wt(kg): --Vital Signs Last 24 Hrs  T(C): 37.2 (28 Apr 2024 05:13), Max: 37.2 (27 Apr 2024 20:45)  T(F): 98.9 (28 Apr 2024 05:13), Max: 98.9 (27 Apr 2024 20:45)  HR: 70 (28 Apr 2024 05:13) (70 - 74)  BP: 128/76 (28 Apr 2024 05:13) (125/75 - 155/78)  BP(mean): --  RR: 18 (28 Apr 2024 05:13) (18 - 18)  SpO2: 99% (28 Apr 2024 05:13) (98% - 99%)    Parameters below as of 28 Apr 2024 05:13  Patient On (Oxygen Delivery Method): room air        PHYSICAL EXAM:  GENERAL: NAD, well-groomed, well-developed  NECK: Supple, No JVD  CHEST/LUNG: Clear to auscultation bilaterally; No rales, rhonchi, wheezing, or rubs  HEART: Regular rate and rhythm; No murmurs, rubs, or gallops  ABDOMEN: Soft, Nontender, Nondistended; Bowel sounds present.    EXTREMITIES:  2+ Peripheral Pulses, No clubbing, cyanosis, or edema  PSYCH: Alert & Oriented x3            LABS:                        9.8    8.14  )-----------( 281      ( 27 Apr 2024 07:19 )             31.9               CAPILLARY BLOOD GLUCOSE      POCT Blood Glucose.: 146 mg/dL (27 Apr 2024 21:15)  POCT Blood Glucose.: 138 mg/dL (27 Apr 2024 17:30)  POCT Blood Glucose.: 124 mg/dL (27 Apr 2024 12:44)  POCT Blood Glucose.: 134 mg/dL (27 Apr 2024 08:55)            RADIOLOGY & ADDITIONAL TESTS:    Imaging Personally Reviewed:  [ ] YES  [ ] NO

## 2024-04-29 DIAGNOSIS — K63.89 OTHER SPECIFIED DISEASES OF INTESTINE: ICD-10-CM

## 2024-04-29 LAB
GLUCOSE BLDC GLUCOMTR-MCNC: 116 MG/DL — HIGH (ref 70–99)
GLUCOSE BLDC GLUCOMTR-MCNC: 123 MG/DL — HIGH (ref 70–99)
GLUCOSE BLDC GLUCOMTR-MCNC: 125 MG/DL — HIGH (ref 70–99)
GLUCOSE BLDC GLUCOMTR-MCNC: 137 MG/DL — HIGH (ref 70–99)
HCT VFR BLD CALC: 29.8 % — LOW (ref 39–50)
HGB BLD-MCNC: 9.4 G/DL — LOW (ref 13–17)
MCHC RBC-ENTMCNC: 26.2 PG — LOW (ref 27–34)
MCHC RBC-ENTMCNC: 31.5 GM/DL — LOW (ref 32–36)
MCV RBC AUTO: 83 FL — SIGNIFICANT CHANGE UP (ref 80–100)
NRBC # BLD: 0 /100 WBCS — SIGNIFICANT CHANGE UP (ref 0–0)
PLATELET # BLD AUTO: 296 K/UL — SIGNIFICANT CHANGE UP (ref 150–400)
RBC # BLD: 3.59 M/UL — LOW (ref 4.2–5.8)
RBC # FLD: 13.4 % — SIGNIFICANT CHANGE UP (ref 10.3–14.5)
WBC # BLD: 7.74 K/UL — SIGNIFICANT CHANGE UP (ref 3.8–10.5)
WBC # FLD AUTO: 7.74 K/UL — SIGNIFICANT CHANGE UP (ref 3.8–10.5)

## 2024-04-29 PROCEDURE — 99232 SBSQ HOSP IP/OBS MODERATE 35: CPT

## 2024-04-29 PROCEDURE — 99232 SBSQ HOSP IP/OBS MODERATE 35: CPT | Mod: 57

## 2024-04-29 RX ORDER — POLYETHYLENE GLYCOL 3350 17 G/17G
17 POWDER, FOR SOLUTION ORAL
Refills: 0 | Status: COMPLETED | OUTPATIENT
Start: 2024-04-29 | End: 2024-04-29

## 2024-04-29 RX ADMIN — AMLODIPINE BESYLATE 5 MILLIGRAM(S): 2.5 TABLET ORAL at 05:08

## 2024-04-29 RX ADMIN — POLYETHYLENE GLYCOL 3350 17 GRAM(S): 17 POWDER, FOR SOLUTION ORAL at 13:50

## 2024-04-29 RX ADMIN — POLYETHYLENE GLYCOL 3350 17 GRAM(S): 17 POWDER, FOR SOLUTION ORAL at 11:49

## 2024-04-29 RX ADMIN — POLYETHYLENE GLYCOL 3350 17 GRAM(S): 17 POWDER, FOR SOLUTION ORAL at 09:51

## 2024-04-29 NOTE — PROGRESS NOTE ADULT - PROBLEM SELECTOR PLAN 1
CT with thickening in sigmoid colon. Biopsy confirmed moderately differentiated adenocarcinoma  - colonoscopy with partial obstructing mass in colon.  - CT chest for staging reviewed, negative for metastatic disease  - Colorectal Surgery consulted, planned for OR on Wed 5/1 @ 7:30am  - miralax and prep as per CRS  - Trend H&H and transfuse for goal hgb > 7

## 2024-04-29 NOTE — PROGRESS NOTE ADULT - ASSESSMENT
No new c/o.  Benign abdomen.   Scheduled for lap LAR Wednesday 7:30 am.  Will give him Miralax 3 doses today.  Preop prep tomorrow.  No new c/o. No rectal bleeding recently.  Benign abdomen.   Scheduled for lap LAR Wednesday 7:30 am.  Will give him Miralax 3 doses today.  Preop prep tomorrow.   I discussed dx and plan with pt and on video call with daughter in [place].  Discussed colonoscopy bx results.  Discussed plan is for lap LAR with anastomosis.   Given h/o pelvic RT for prostate ca i discussed below scenarios;  - there is a small (approximately 5%) chance an anastomosis would not be feasible if there is excessive scarring of the rectum in the pelvis in which case he would have a colostomy which would be permanent.   - There is an approximately 20% chance I would need to create a diverting loop ileostomy to protect the anastomosis which he would have for approximately 6 weeks.   All questions answered.

## 2024-04-29 NOTE — PROGRESS NOTE ADULT - PROBLEM SELECTOR PLAN 2
Pt with METS>4, has RCRI of 0 indicating class I risk and 3.9% chance of 30 day MACE.   - TTE reviewed has EF of 57%, no wall motion abnormalities  - No contraindication from medicine standpoint for OR. patient is currently medically optimized.

## 2024-04-29 NOTE — PROGRESS NOTE ADULT - ASSESSMENT
80 yo male with PMH of HTN and T2DM who presented wit BRBPR found to have colonic mass on colonoscopy concerning for colon adenocarcinoma pending OR with CRS on Wed, 5/1.

## 2024-04-29 NOTE — PROGRESS NOTE ADULT - NSPROGADDITIONALINFOA_GEN_ALL_CORE
.  Celeste Mar MD  Division of Hospital Medicine  [facility]   Available on Microsoft Teams - messages preferred prior to calls.    Plan discussed with patient and daughter via video call, and medicine RUBÉN Lujan.
d/w daughter over the phone
d/w niece over the phone

## 2024-04-29 NOTE — PROGRESS NOTE ADULT - TIME BILLING
- Ordering, reviewing, and interpreting labs, testing, and imaging.  - Independently obtaining a review of systems and performing a physical exam  - Reviewing consultant documentation/recommendations.  - Counselling and educating patient regarding interpretation of aforementioned items and plan of care.
- Ordering, reviewing, and interpreting labs, testing, and imaging.  - Independently obtaining a review of systems and performing a physical exam  - Reviewing consultant documentation/recommendations in addition to discussing plan of care with consultants.  - Counselling and educating patient and family regarding interpretation of aforementioned items and plan of care.

## 2024-04-29 NOTE — PROGRESS NOTE ADULT - SUBJECTIVE AND OBJECTIVE BOX
Celeste Mar MD  Division of Hospital Medicine  [facility]   Available on Microsoft Teams (Mon-Fri 8am-5pm)    * messages preferred prior to calls  Other Times:  [phone]      Patient is a 79y old  Male who presents with a chief complaint of     SUBJECTIVE / OVERNIGHT EVENTS: no acute events overnight. no fever, chills, chest pain, dyspnea, abd pain, n/v. last episode of BRBPR ~2 days ago.   ADDITIONAL REVIEW OF SYSTEMS:    MEDICATIONS  (STANDING):  amLODIPine   Tablet 5 milliGRAM(s) Oral daily  dextrose 10% Bolus 125 milliLiter(s) IV Bolus once  dextrose 5%. 1000 milliLiter(s) (50 mL/Hr) IV Continuous <Continuous>  dextrose 5%. 1000 milliLiter(s) (100 mL/Hr) IV Continuous <Continuous>  dextrose 50% Injectable 12.5 Gram(s) IV Push once  dextrose 50% Injectable 25 Gram(s) IV Push once  glucagon  Injectable 1 milliGRAM(s) IntraMuscular once  insulin lispro (ADMELOG) corrective regimen sliding scale   SubCutaneous three times a day before meals  insulin lispro (ADMELOG) corrective regimen sliding scale   SubCutaneous at bedtime  polyethylene glycol 3350 17 Gram(s) Oral <User Schedule>  sodium chloride 0.9%. 500 milliLiter(s) (30 mL/Hr) IV Continuous <Continuous>    MEDICATIONS  (PRN):  acetaminophen     Tablet .. 650 milliGRAM(s) Oral every 6 hours PRN Temp greater or equal to 38C (100.4F), Mild Pain (1 - 3)  aluminum hydroxide/magnesium hydroxide/simethicone Suspension 30 milliLiter(s) Oral every 4 hours PRN Dyspepsia  dextrose Oral Gel 15 Gram(s) Oral once PRN Blood Glucose LESS THAN 70 milliGRAM(s)/deciliter  melatonin 3 milliGRAM(s) Oral at bedtime PRN Insomnia  ondansetron Injectable 4 milliGRAM(s) IV Push every 8 hours PRN Nausea and/or Vomiting      CAPILLARY BLOOD GLUCOSE      POCT Blood Glucose.: 123 mg/dL (29 Apr 2024 08:30)  POCT Blood Glucose.: 159 mg/dL (28 Apr 2024 21:24)  POCT Blood Glucose.: 146 mg/dL (28 Apr 2024 17:35)  POCT Blood Glucose.: 125 mg/dL (28 Apr 2024 13:02)    I&O's Summary    28 Apr 2024 07:01  -  29 Apr 2024 07:00  --------------------------------------------------------  IN: 1300 mL / OUT: 600 mL / NET: 700 mL    29 Apr 2024 07:01  -  29 Apr 2024 08:59  --------------------------------------------------------  IN: 320 mL / OUT: 0 mL / NET: 320 mL        PHYSICAL EXAM:  Vital Signs Last 24 Hrs  T(C): 36.9 (29 Apr 2024 08:50), Max: 37.3 (28 Apr 2024 14:28)  T(F): 98.4 (29 Apr 2024 08:50), Max: 99.1 (28 Apr 2024 14:28)  HR: 79 (29 Apr 2024 08:50) (71 - 79)  BP: 118/61 (29 Apr 2024 08:50) (115/73 - 136/64)  BP(mean): --  RR: 18 (29 Apr 2024 08:50) (18 - 18)  SpO2: 99% (29 Apr 2024 08:50) (96% - 99%)    Parameters below as of 29 Apr 2024 08:50  Patient On (Oxygen Delivery Method): room air      CONSTITUTIONAL: NAD, well-developed, well-groomed  EYES: PERRLA; conjunctiva and sclera clear  ENMT: Moist oral mucosa, no pharyngeal injection or exudates; normal dentition  NECK: Supple, no palpable masses; no thyromegaly  RESPIRATORY: Normal respiratory effort; lungs are clear to auscultation bilaterally  CARDIOVASCULAR: Regular rate and rhythm, normal S1 and S2, no murmur/rub/gallop; No lower extremity edema  ABDOMEN: Soft, Nondistended, Nontender to palpation, normoactive bowel sounds  MUSCULOSKELETAL: No clubbing or cyanosis of digits; no joint swelling or tenderness to palpation  PSYCH: A+O to person, place, and time; affect appropriate  NEUROLOGY: CN 2-12 are intact and symmetric; no gross sensory deficits   SKIN: No rashes; no palpable lesions    LABS:                        9.4    7.74  )-----------( 296      ( 29 Apr 2024 06:58 )             29.8         RADIOLOGY & ADDITIONAL TESTS:  Results Reviewed: stable anemia, no leukocytosis  Imaging Personally Reviewed:  Electrocardiogram Personally Reviewed:    COORDINATION OF CARE:  Care Discussed with Consultants/Other Providers [Y]: medicine RUBÉN Lujan  Prior or Outpatient Records Reviewed [Y/N]:

## 2024-04-30 ENCOUNTER — TRANSCRIPTION ENCOUNTER (OUTPATIENT)
Age: 79
End: 2024-04-30

## 2024-04-30 LAB
ANION GAP SERPL CALC-SCNC: 12 MMOL/L — SIGNIFICANT CHANGE UP (ref 5–17)
BUN SERPL-MCNC: 22 MG/DL — SIGNIFICANT CHANGE UP (ref 7–23)
CALCIUM SERPL-MCNC: 9.5 MG/DL — SIGNIFICANT CHANGE UP (ref 8.4–10.5)
CHLORIDE SERPL-SCNC: 101 MMOL/L — SIGNIFICANT CHANGE UP (ref 96–108)
CO2 SERPL-SCNC: 24 MMOL/L — SIGNIFICANT CHANGE UP (ref 22–31)
CREAT SERPL-MCNC: 1.04 MG/DL — SIGNIFICANT CHANGE UP (ref 0.5–1.3)
CULTURE RESULTS: SIGNIFICANT CHANGE UP
EGFR: 73 ML/MIN/1.73M2 — SIGNIFICANT CHANGE UP
GLUCOSE BLDC GLUCOMTR-MCNC: 126 MG/DL — HIGH (ref 70–99)
GLUCOSE BLDC GLUCOMTR-MCNC: 154 MG/DL — HIGH (ref 70–99)
GLUCOSE BLDC GLUCOMTR-MCNC: 163 MG/DL — HIGH (ref 70–99)
GLUCOSE BLDC GLUCOMTR-MCNC: 191 MG/DL — HIGH (ref 70–99)
GLUCOSE SERPL-MCNC: 106 MG/DL — HIGH (ref 70–99)
HCT VFR BLD CALC: 31.6 % — LOW (ref 39–50)
HGB BLD-MCNC: 9.6 G/DL — LOW (ref 13–17)
MAGNESIUM SERPL-MCNC: 2.2 MG/DL — SIGNIFICANT CHANGE UP (ref 1.6–2.6)
MCHC RBC-ENTMCNC: 25.4 PG — LOW (ref 27–34)
MCHC RBC-ENTMCNC: 30.4 GM/DL — LOW (ref 32–36)
MCV RBC AUTO: 83.6 FL — SIGNIFICANT CHANGE UP (ref 80–100)
NRBC # BLD: 0 /100 WBCS — SIGNIFICANT CHANGE UP (ref 0–0)
PHOSPHATE SERPL-MCNC: 4 MG/DL — SIGNIFICANT CHANGE UP (ref 2.5–4.5)
PLATELET # BLD AUTO: 307 K/UL — SIGNIFICANT CHANGE UP (ref 150–400)
POTASSIUM SERPL-MCNC: 4.5 MMOL/L — SIGNIFICANT CHANGE UP (ref 3.5–5.3)
POTASSIUM SERPL-SCNC: 4.5 MMOL/L — SIGNIFICANT CHANGE UP (ref 3.5–5.3)
RBC # BLD: 3.78 M/UL — LOW (ref 4.2–5.8)
RBC # FLD: 13.5 % — SIGNIFICANT CHANGE UP (ref 10.3–14.5)
SODIUM SERPL-SCNC: 137 MMOL/L — SIGNIFICANT CHANGE UP (ref 135–145)
SPECIMEN SOURCE: SIGNIFICANT CHANGE UP
WBC # BLD: 7.69 K/UL — SIGNIFICANT CHANGE UP (ref 3.8–10.5)
WBC # FLD AUTO: 7.69 K/UL — SIGNIFICANT CHANGE UP (ref 3.8–10.5)

## 2024-04-30 PROCEDURE — 99232 SBSQ HOSP IP/OBS MODERATE 35: CPT

## 2024-04-30 PROCEDURE — 99232 SBSQ HOSP IP/OBS MODERATE 35: CPT | Mod: 57

## 2024-04-30 RX ORDER — NEOMYCIN SULFATE 500 MG/1
500 TABLET ORAL ONCE
Refills: 0 | Status: COMPLETED | OUTPATIENT
Start: 2024-04-30 | End: 2024-04-30

## 2024-04-30 RX ORDER — NEOMYCIN SULFATE 500 MG/1
500 TABLET ORAL
Refills: 0 | Status: DISCONTINUED | OUTPATIENT
Start: 2024-04-30 | End: 2024-04-30

## 2024-04-30 RX ORDER — POLYETHYLENE GLYCOL 3350 17 G/17G
17 POWDER, FOR SOLUTION ORAL
Refills: 0 | Status: COMPLETED | OUTPATIENT
Start: 2024-04-30 | End: 2024-04-30

## 2024-04-30 RX ORDER — NEOMYCIN SULFATE 500 MG/1
500 TABLET ORAL
Refills: 0 | Status: DISCONTINUED | OUTPATIENT
Start: 2024-04-30 | End: 2024-05-01

## 2024-04-30 RX ORDER — METRONIDAZOLE 500 MG
250 TABLET ORAL ONCE
Refills: 0 | Status: COMPLETED | OUTPATIENT
Start: 2024-04-30 | End: 2024-04-30

## 2024-04-30 RX ORDER — METRONIDAZOLE 500 MG
250 TABLET ORAL
Refills: 0 | Status: DISCONTINUED | OUTPATIENT
Start: 2024-04-30 | End: 2024-05-01

## 2024-04-30 RX ADMIN — NEOMYCIN SULFATE 500 MILLIGRAM(S): 500 TABLET ORAL at 09:01

## 2024-04-30 RX ADMIN — Medication 250 MILLIGRAM(S): at 15:34

## 2024-04-30 RX ADMIN — POLYETHYLENE GLYCOL 3350 17 GRAM(S): 17 POWDER, FOR SOLUTION ORAL at 20:48

## 2024-04-30 RX ADMIN — POLYETHYLENE GLYCOL 3350 17 GRAM(S): 17 POWDER, FOR SOLUTION ORAL at 13:48

## 2024-04-30 RX ADMIN — POLYETHYLENE GLYCOL 3350 17 GRAM(S): 17 POWDER, FOR SOLUTION ORAL at 15:28

## 2024-04-30 RX ADMIN — POLYETHYLENE GLYCOL 3350 17 GRAM(S): 17 POWDER, FOR SOLUTION ORAL at 16:48

## 2024-04-30 RX ADMIN — POLYETHYLENE GLYCOL 3350 17 GRAM(S): 17 POWDER, FOR SOLUTION ORAL at 18:33

## 2024-04-30 RX ADMIN — POLYETHYLENE GLYCOL 3350 17 GRAM(S): 17 POWDER, FOR SOLUTION ORAL at 22:45

## 2024-04-30 RX ADMIN — Medication 1: at 18:32

## 2024-04-30 RX ADMIN — Medication 1: at 12:38

## 2024-04-30 RX ADMIN — Medication 250 MILLIGRAM(S): at 22:45

## 2024-04-30 RX ADMIN — POLYETHYLENE GLYCOL 3350 17 GRAM(S): 17 POWDER, FOR SOLUTION ORAL at 11:10

## 2024-04-30 RX ADMIN — POLYETHYLENE GLYCOL 3350 17 GRAM(S): 17 POWDER, FOR SOLUTION ORAL at 09:09

## 2024-04-30 RX ADMIN — AMLODIPINE BESYLATE 5 MILLIGRAM(S): 2.5 TABLET ORAL at 05:43

## 2024-04-30 RX ADMIN — NEOMYCIN SULFATE 500 MILLIGRAM(S): 500 TABLET ORAL at 15:34

## 2024-04-30 RX ADMIN — Medication 250 MILLIGRAM(S): at 09:01

## 2024-04-30 RX ADMIN — NEOMYCIN SULFATE 500 MILLIGRAM(S): 500 TABLET ORAL at 22:45

## 2024-04-30 NOTE — ADVANCED PRACTICE NURSE CONSULT - REASON FOR CONSULT
Requested by Dr. Aponte to see patient for preop stoma marking and education. Pt is scheduled for a possible ostomy surgery tomorrow. H & P is noted.    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 No active hemorrhage from mass at this time, GARCIA without blood, no abd pain, tolerting diet and having bowel function. H/H stable. Surgery consulted for colon mass.     Requested by Dr. Aponte to see patient for preop stoma marking and education. Pt is scheduled for a possible ostomy surgery tomorrow. H & P is noted.    79 year old male with PMH HTN, DM, prostate ca s/p RT presents with hematochezia found to have concern for colonic malignancy on CT, no liver lesions, colonoscopy revealed rectosigmoid mass 15cm from anal verge nonobstructive no synchronous lesions. No active hemorrhage from mass at this time, GARCIA without blood, no abd pain, tolerting diet and having bowel function. H/H stable. Surgery consulted for colon mass.

## 2024-04-30 NOTE — PROGRESS NOTE ADULT - SUBJECTIVE AND OBJECTIVE BOX
[facility] Division of Hospital Medicine  Lubna Jung MD  Available via MS Teams      SUBJECTIVE / OVERNIGHT EVENTS: No acute events overnight. Pt reports some anxiety for the procedure but overall feels well.     ADDITIONAL REVIEW OF SYSTEMS: ROS reviewed and found to be negative     MEDICATIONS  (STANDING):  amLODIPine   Tablet 5 milliGRAM(s) Oral daily  dextrose 10% Bolus 125 milliLiter(s) IV Bolus once  dextrose 5%. 1000 milliLiter(s) (50 mL/Hr) IV Continuous <Continuous>  dextrose 5%. 1000 milliLiter(s) (100 mL/Hr) IV Continuous <Continuous>  dextrose 50% Injectable 12.5 Gram(s) IV Push once  dextrose 50% Injectable 25 Gram(s) IV Push once  glucagon  Injectable 1 milliGRAM(s) IntraMuscular once  insulin lispro (ADMELOG) corrective regimen sliding scale   SubCutaneous at bedtime  insulin lispro (ADMELOG) corrective regimen sliding scale   SubCutaneous three times a day before meals  [id number]    Tablet 250 milliGRAM(s) Oral <User Schedule>  neomycin 500 milliGRAM(s) Oral <User Schedule>  polyethylene glycol 3350 17 Gram(s) Oral <User Schedule>  sodium chloride 0.9%. 500 milliLiter(s) (30 mL/Hr) IV Continuous <Continuous>    MEDICATIONS  (PRN):  acetaminophen     Tablet .. 650 milliGRAM(s) Oral every 6 hours PRN Temp greater or equal to 38C (100.4F), Mild Pain (1 - 3)  aluminum hydroxide/magnesium hydroxide/simethicone Suspension 30 milliLiter(s) Oral every 4 hours PRN Dyspepsia  dextrose Oral Gel 15 Gram(s) Oral once PRN Blood Glucose LESS THAN 70 milliGRAM(s)/deciliter  melatonin 3 milliGRAM(s) Oral at bedtime PRN Insomnia  ondansetron Injectable 4 milliGRAM(s) IV Push every 8 hours PRN Nausea and/or Vomiting      I&O's Summary    29 Apr 2024 07:01  -  30 Apr 2024 07:00  --------------------------------------------------------  IN: 660 mL / OUT: 600 mL / NET: 60 mL        PHYSICAL EXAM:  Vital Signs Last 24 Hrs  T(C): 36.6 (30 Apr 2024 09:02), Max: 37.2 (29 Apr 2024 16:04)  T(F): 97.9 (30 Apr 2024 09:02), Max: 98.9 (29 Apr 2024 16:04)  HR: 70 (30 Apr 2024 09:02) (67 - 70)  BP: 127/71 (30 Apr 2024 09:02) (114/61 - 131/69)  BP(mean): --  RR: 18 (30 Apr 2024 09:02) (18 - 18)  SpO2: 98% (30 Apr 2024 09:02) (95% - 100%)    Parameters below as of 30 Apr 2024 09:02  Patient On (Oxygen Delivery Method): room air      CONSTITUTIONAL: NAD, well-developed, well-groomed, sitting up on side of bed   EYES: Conjunctiva and sclera clear  NECK: Supple  RESPIRATORY: Normal respiratory effort; lungs are clear to auscultation bilaterally  CARDIOVASCULAR: Regular rate and rhythm, normal S1 and S2, No lower extremity edema  ABDOMEN: Nontender to palpation, normoactive bowel sounds  MUSCULOSKELETAL:  Normal gait  PSYCH: A+O to person, place, and time  NEUROLOGY: No gross sensory deficits   SKIN: No rashes    LABS:                        9.6    7.69  )-----------( 307      ( 30 Apr 2024 06:43 )             31.6     04-30    137  |  101  |  22  ----------------------------<  106<H>  4.5   |  24  |  1.04    Ca    9.5      30 Apr 2024 06:43  Phos  4.0     04-30  Mg     2.2     04-30

## 2024-04-30 NOTE — PROGRESS NOTE ADULT - PROBLEM SELECTOR PROBLEM 4
Hypertension
Type 2 diabetes mellitus
Hypertension
Type 2 diabetes mellitus
Hypertension
Type 2 diabetes mellitus
Type 2 diabetes mellitus
Hypertension

## 2024-04-30 NOTE — PROGRESS NOTE ADULT - PROBLEM SELECTOR PROBLEM 1
Abnormal CT scan
Colonic mass
Colonic mass

## 2024-04-30 NOTE — ADVANCED PRACTICE NURSE CONSULT - ASSESSMENT
In to see pt as requested by Dr Styles for pre-op stoma marking & education. Chart reviewed & events noted. Pt in bed "feeling okay" in virtual conversation with daughter on phone. Introduced self & role of COCN. Pt scheduled for surgery tomorrow with a possibility of an ostomy. Pt & daughter verbalized understanding of planned procedure including "the bag", hoping not to get one. Pt expressed feeling nervous, emotional support, active listening, & assured pt & daughter of ongoing support & teaching.      Reviewed anatomy & function & basics of ileostomy care as well as returning to "normalcy" w/new stoma creation (ie showering, clothing, hobbies, lifestyle, etc.) Discussed expected return of bowel function. Demonstrated pouching system & reviewed steps for emptying pouch. Pt/family observed & asked appropriate questions. Pt hold off demonstration of pouching system at this time, would like to wait until after the surgery. Briefly reviewed  ileostomy dietary guidelines. Briefly discussed supply ordering process & transitioning to home care. Both appreciative     Explained purpose & procedure of stoma marking. Pt verbalized understanding & agreed to the marking. Abdominal contours evaluation performed in lying, sitting, standing & bending positions. Rectus borders located. Creases noted in umbilical area as well as pt is short-waisted & attempts made to avoid "waist line". Stoma marking placed in RLQ (upper/lower) using indelible ink & covered w/Tegaderm. Pt able to see marking in sitting, bending & standing positions. Ileostomy educational materials provided to pt along with "extra" tegaderm. Will follow post op.    Explained purpose & procedure of stoma marking. Pt verbalized understanding & agreed to the marking. Abdominal contours evaluation performed in sitting, standing & bending positions. Rectus borders located. Creases /abdominal folds with visible old ostomy site scars noted. Stoma marking placed in RLQ (upper/lower) using indelible ink & covered w/Tegaderm. Pt able to see marking in sitting, bending & standing positions. Ileostomy educational materials provided to pt along with "extra" tegaderm, & "practice pouch". Encourage to review ileostomy booklet. Contact info provided. Will follow for post op teaching.        In to see pt as requested by Dr Styles for pre-op stoma marking & education. Chart reviewed & events noted. Pt in bed "feeling okay" in virtual conversation with daughter on phone. Introduced self & role of COCN. Pt scheduled for surgery tomorrow with a possibility of an ostomy. Pt & daughter verbalized understanding of planned procedure including "the bag", hoping not to get one. Pt expressed feeling nervous, emotional support provided, & assured pt & daughter of ongoing support & teaching.      Reviewed anatomy & function & basics of ileostomy care as well as returning to "normalcy" w/new stoma creation (ie showering, clothing, hobbies, lifestyle, etc.) Discussed expected return of bowel function. Demonstrated pouching system & reviewed steps for emptying pouch. Pt/family observed & asked appropriate questions. Pt did not return demonstrate at this time, would like to wait until after the surgery "if I get the bag". Briefly reviewed  ileostomy dietary guidelines, including low fiber diet, discussed supply ordering process & transitioning to home care. Both appreciative of time & information received.     Explained purpose & procedure of stoma marking. Pt verbalized understanding & agreed to the marking. Abdominal contours evaluation performed in lying, sitting, standing & bending positions. Rectus borders located. Creases noted in umbilical area as well as pt is short-waisted & attempts made to avoid "waist line". Stoma marking placed in RLQ using indelible ink & covered w/Tegaderm. Pt able to see marking in sitting & standing positions. Ileostomy educational materials & practice pouch declined by pt at this time. Will follow post op as appropriate.

## 2024-04-30 NOTE — PROGRESS NOTE ADULT - PROBLEM SELECTOR PROBLEM 3
BRBPR (bright red blood per rectum)
Hypertension
BRBPR (bright red blood per rectum)

## 2024-04-30 NOTE — PROGRESS NOTE ADULT - PROBLEM SELECTOR PLAN 3
With resultant acute blood loss anemia.  - Likely 2/2 colonic malignancy  - monitor H/H and tranfuse for goal Hb>7
On perindopril and amlodipine combo pill  -BP labile but overall acceptable  -c/w amlodipine 5mg. Adjust regimen as needed
On perindopril and amlodipine combo pill  -BP acceptable at present  -c/w amlodipine 5mg. Adjust regimen as needed
On perindopril and amlodipine combo pill  -BP elevated overnight, acceptable this am  -c/w amlodipine 5mg. Adjust regimen as needed
With resultant acute blood loss anemia.  - Likely 2/2 colonic malignancy
On perindopril and amlodipine combo pill  -BP labile but overall acceptable  -c/w amlodipine 5mg. Adjust regimen as needed
With resultant acute blood loss anemia.  - Likely 2/2 colonic malignancy
With resultant acute blood loss anemia.  - Likely 2/2 colonic malignancy  - monitor H/H and transfuse for goal Hb>7

## 2024-04-30 NOTE — ADVANCED PRACTICE NURSE CONSULT - RECOMMEDATIONS
Will recommend:  1. Ostomy RNs to f/u for teaching post-operatively as appropriate if stoma is created.  2. Contact daughter Jeane for teaching as appropriate( daughter is in [place] teaching via facetime)   3. Contact Ostomy RNs if marking begins to fade prior to surgery for "remarking" morning of surgery  Emotional support & encouragement provided throughout visit.

## 2024-04-30 NOTE — PROGRESS NOTE ADULT - PROBLEM SELECTOR PROBLEM 5
Prophylactic measure
Prophylactic measure
Type 2 diabetes mellitus
Type 2 diabetes mellitus
Prophylactic measure
Prophylactic measure
Type 2 diabetes mellitus
Type 2 diabetes mellitus

## 2024-04-30 NOTE — PROGRESS NOTE ADULT - ASSESSMENT
No new c/o. No rectal bleeding recently.  Benign abdomen.   Scheduled for lap LAR Wednesday 7:30 am.  Preop prep today.   Need to hold all DVT prophylaxis today for plan of intrathecal Duramorph tomorrow as part of ERP.

## 2024-04-30 NOTE — PROGRESS NOTE ADULT - PROBLEM SELECTOR PLAN 5
VTE ppx: scd for now
VTE ppx: scd for now
- on metformin at home  - HbA1c 6.5%  - c/w low dose sliding scale  - FS acceptable
-on metformin   - c/w ISS while inpatient for now
-on metformin   - c/w ISS while inpatient for now
VTE ppx: scd for now
VTE ppx: scd for now
- on metformin at home  - HbA1c 6.5%  - c/w low dose sliding scale  - FS acceptable

## 2024-04-30 NOTE — PROGRESS NOTE ADULT - PROBLEM SELECTOR PLAN 4
On perindopril and amlodipine combo pill  -BP labile but overall acceptable  -c/w amlodipine 5mg. Adjust regimen as needed
-on metformin   - c/w ISS while inpatient for now
On perindopril and amlodipine combo pill  - BP labile but overall acceptable  - c/w amlodipine 5mg. Adjust regimen as needed
-on metformin   - c/w ISS while inpatient for now
-on metformin   - c/w ISS while inpatient for now
On perindopril and amlodipine combo pill  - BP labile but overall acceptable  - c/w amlodipine 5mg. Adjust regimen as needed
-on metformin   - c/w ISS while inpatient for now
On perindopril and amlodipine combo pill  -BP labile but overall acceptable  -c/w amlodipine 5mg. Adjust regimen as needed

## 2024-04-30 NOTE — PROGRESS NOTE ADULT - ASSESSMENT
78 yo male with PMH of HTN and T2DM who presented with BRBPR found to have colonic mass on colonoscopy concerning for colon adenocarcinoma pending OR with CRS on Wed, 5/1.

## 2024-04-30 NOTE — PROGRESS NOTE ADULT - PROBLEM SELECTOR PROBLEM 2
BRBPR (bright red blood per rectum)
Pre-operative examination
BRBPR (bright red blood per rectum)
Pre-operative examination
Pre-operative examination
BRBPR (bright red blood per rectum)
Pre-operative examination
BRBPR (bright red blood per rectum)

## 2024-04-30 NOTE — CHART NOTE - NSCHARTNOTEFT_GEN_A_CORE
Colonoscopy pathology reviewed. Patient is scheduled for lap LAR Wednesday 7:30 am.    ACCESSION No:  [id number]  Patient:     NEHEMIAS GARCIA      Accession:                             [id number]    Collected Date/Time:                   4/24/2024 12:14 EDT  Received Date/Time:                    4/24/2024 12:14 EDT    Surgical Pathology Report - Auth (Verified)    Specimen(s) Submitted  1.  Rectal, sigmoid mass biopsy    Final Diagnosis  1. Recto -sigmoid colon, mass, biopsy:  - Colonic adenocarcinoma, moderately differentiated    Note:  MMR ([facility]) will be reported as an addendum .    This case was reviewed as an intradepartmental consultation with Dr. Schafer, who concurs with the diagnosis.  Report faxed to Dr. Alarcon's on 04/26/2024.  Verified by: CARMELO MUJICA M.D.  (Electronic Signature)  Reported on: 04/26/24 11:51 EDT, [facility] [facility] [place] [address]  Phone: [phone]   Fax: [phone]

## 2024-04-30 NOTE — PROGRESS NOTE ADULT - PROBLEM SELECTOR PLAN 1
CT with thickening in sigmoid colon. Biopsy confirmed moderately differentiated adenocarcinoma  - colonoscopy with partial obstructing mass in colon.  - CT chest for staging reviewed, negative for metastatic disease  - Colorectal Surgery consulted, planned for OR on Wed 5/1 @ 7:30am  - miralax and prep as per CRS  - Trend H&H and transfuse for goal hgb > 7. microcytic anemia likely 2/2 to bleeding

## 2024-04-30 NOTE — CHART NOTE - NSCHARTNOTEFT_GEN_A_CORE
Pre-operative Note    - Pre-operative Diagnosis: colon cancer    - Procedure: laparoscopic LAR, possible open, possible ostomy     - Labs:                        9.6    7.69  )-----------( 307      ( 30 Apr 2024 06:43 )             31.6     04-30    137  |  101  |  22  ----------------------------<  106<H>  4.5   |  24  |  1.04    Ca    9.5      30 Apr 2024 06:43  Phos  4.0     04-30  Mg     2.2     04-30        Type & Screen #1: pending for 5/1  Type & Screen #2: pending for 5/1    - CXR (4/22):  The lungs are clear.  There is no pleural effusion or pneumothorax.  The heart size is normal.  No acute bony abnormality.    IMPRESSION:  Clear lungs.    - TTE (4/26):  1. Left ventricular cavity is normal in size. Left ventricular wall thickness is normal. Left ventricular systolic function is normal with an ejection fraction of 57 % by Beckett's method of disks. There are no regional wall motion abnormalities seen.   2. Left ventricular global longitudinal strain is -22.8 % which is normal (< -18%). Images were acquired on a [employer] ultrasound system and processed using [employer] strain analysis software with a heart rate of 80 bpm and a blood pressure of 125/62 mmHg.   3. Normal left ventricular diastolic function, with normal filling pressure.   4. Normal right ventricular cavity size, with normal wall thickness, and normal systolic function.   5. Normal left and right atrial size.   6. No significant valvular disease.   7. No pericardial effusion seen.   8. No prior echocardiogram is available for comparison.    - Blood: Not needed.     - Orders:  > NPO at midnight  > IVF at midnight  > Perioperative antibiotics ordered for ERP colon prep  > Morning Labs: CBC, BMP, coags, type & screen    - Permits:  > Consent in chart.  > Case scheduled with OR. Pre-operative Note    - Pre-operative Diagnosis: colon cancer    - Procedure: laparoscopic LAR, possible open, possible ostomy     - Labs:                        9.6    7.69  )-----------( 307      ( 30 Apr 2024 06:43 )             31.6     04-30    137  |  101  |  22  ----------------------------<  106<H>  4.5   |  24  |  1.04    Ca    9.5      30 Apr 2024 06:43  Phos  4.0     04-30  Mg     2.2     04-30        Type & Screen #1: pending for 5/1  Type & Screen #2: pending for 5/1    - CXR (4/22):  The lungs are clear.  There is no pleural effusion or pneumothorax.  The heart size is normal.  No acute bony abnormality.    IMPRESSION:  Clear lungs.    - TTE (4/26):  1. Left ventricular cavity is normal in size. Left ventricular wall thickness is normal. Left ventricular systolic function is normal with an ejection fraction of 57 % by Beckett's method of disks. There are no regional wall motion abnormalities seen.   2. Left ventricular global longitudinal strain is -22.8 % which is normal (< -18%). Images were acquired on a [employer] ultrasound system and processed using [employer] strain analysis software with a heart rate of 80 bpm and a blood pressure of 125/62 mmHg.   3. Normal left ventricular diastolic function, with normal filling pressure.   4. Normal right ventricular cavity size, with normal wall thickness, and normal systolic function.   5. Normal left and right atrial size.   6. No significant valvular disease.   7. No pericardial effusion seen.   8. No prior echocardiogram is available for comparison.    - Blood: Not needed.     - Orders:  > NPO at midnight  > IVF at midnight  > Perioperative antibiotics ordered for ERP colon prep  > Morning Labs: CBC, BMP, coags, type & screen    - Permits:  > Consent in chart.  > Case scheduled with OR.    Agree with above.  Pt for lap LAR today.

## 2024-05-01 ENCOUNTER — APPOINTMENT (OUTPATIENT)
Dept: SURGERY | Facility: HOSPITAL | Age: 79
End: 2024-05-01

## 2024-05-01 ENCOUNTER — APPOINTMENT (OUTPATIENT)
Dept: UROLOGY | Facility: HOSPITAL | Age: 79
End: 2024-05-01

## 2024-05-01 ENCOUNTER — RESULT REVIEW (OUTPATIENT)
Age: 79
End: 2024-05-01

## 2024-05-01 LAB
ALBUMIN SERPL ELPH-MCNC: 3.8 G/DL — SIGNIFICANT CHANGE UP (ref 3.3–5)
ALBUMIN SERPL ELPH-MCNC: 4.5 G/DL — SIGNIFICANT CHANGE UP (ref 3.3–5)
ALP SERPL-CCNC: 70 U/L — SIGNIFICANT CHANGE UP (ref 40–120)
ALP SERPL-CCNC: 80 U/L — SIGNIFICANT CHANGE UP (ref 40–120)
ALT FLD-CCNC: 127 U/L — HIGH (ref 10–45)
ALT FLD-CCNC: 56 U/L — HIGH (ref 10–45)
ANION GAP SERPL CALC-SCNC: 12 MMOL/L — SIGNIFICANT CHANGE UP (ref 5–17)
ANION GAP SERPL CALC-SCNC: 14 MMOL/L — SIGNIFICANT CHANGE UP (ref 5–17)
APTT BLD: 27.8 SEC — SIGNIFICANT CHANGE UP (ref 24.5–35.6)
AST SERPL-CCNC: 253 U/L — HIGH (ref 10–40)
AST SERPL-CCNC: 73 U/L — HIGH (ref 10–40)
BASOPHILS # BLD AUTO: 0.02 K/UL — SIGNIFICANT CHANGE UP (ref 0–0.2)
BASOPHILS # BLD AUTO: 0.03 K/UL — SIGNIFICANT CHANGE UP (ref 0–0.2)
BASOPHILS NFR BLD AUTO: 0.1 % — SIGNIFICANT CHANGE UP (ref 0–2)
BASOPHILS NFR BLD AUTO: 0.3 % — SIGNIFICANT CHANGE UP (ref 0–2)
BILIRUB SERPL-MCNC: 0.4 MG/DL — SIGNIFICANT CHANGE UP (ref 0.2–1.2)
BILIRUB SERPL-MCNC: 0.4 MG/DL — SIGNIFICANT CHANGE UP (ref 0.2–1.2)
BLD GP AB SCN SERPL QL: NEGATIVE — SIGNIFICANT CHANGE UP
BUN SERPL-MCNC: 17 MG/DL — SIGNIFICANT CHANGE UP (ref 7–23)
BUN SERPL-MCNC: 18 MG/DL — SIGNIFICANT CHANGE UP (ref 7–23)
CALCIUM SERPL-MCNC: 8.6 MG/DL — SIGNIFICANT CHANGE UP (ref 8.4–10.5)
CALCIUM SERPL-MCNC: 9.8 MG/DL — SIGNIFICANT CHANGE UP (ref 8.4–10.5)
CHLORIDE SERPL-SCNC: 100 MMOL/L — SIGNIFICANT CHANGE UP (ref 96–108)
CHLORIDE SERPL-SCNC: 101 MMOL/L — SIGNIFICANT CHANGE UP (ref 96–108)
CO2 SERPL-SCNC: 21 MMOL/L — LOW (ref 22–31)
CO2 SERPL-SCNC: 24 MMOL/L — SIGNIFICANT CHANGE UP (ref 22–31)
CREAT SERPL-MCNC: 0.83 MG/DL — SIGNIFICANT CHANGE UP (ref 0.5–1.3)
CREAT SERPL-MCNC: 0.98 MG/DL — SIGNIFICANT CHANGE UP (ref 0.5–1.3)
EGFR: 78 ML/MIN/1.73M2 — SIGNIFICANT CHANGE UP
EGFR: 89 ML/MIN/1.73M2 — SIGNIFICANT CHANGE UP
EOSINOPHIL # BLD AUTO: 0 K/UL — SIGNIFICANT CHANGE UP (ref 0–0.5)
EOSINOPHIL # BLD AUTO: 0.18 K/UL — SIGNIFICANT CHANGE UP (ref 0–0.5)
EOSINOPHIL NFR BLD AUTO: 0 % — SIGNIFICANT CHANGE UP (ref 0–6)
EOSINOPHIL NFR BLD AUTO: 2 % — SIGNIFICANT CHANGE UP (ref 0–6)
GLUCOSE BLDC GLUCOMTR-MCNC: 133 MG/DL — HIGH (ref 70–99)
GLUCOSE BLDC GLUCOMTR-MCNC: 150 MG/DL — HIGH (ref 70–99)
GLUCOSE BLDC GLUCOMTR-MCNC: 206 MG/DL — HIGH (ref 70–99)
GLUCOSE SERPL-MCNC: 123 MG/DL — HIGH (ref 70–99)
GLUCOSE SERPL-MCNC: 204 MG/DL — HIGH (ref 70–99)
HCT VFR BLD CALC: 32.1 % — LOW (ref 39–50)
HCT VFR BLD CALC: 34.6 % — LOW (ref 39–50)
HGB BLD-MCNC: 10.3 G/DL — LOW (ref 13–17)
HGB BLD-MCNC: 11 G/DL — LOW (ref 13–17)
IMM GRANULOCYTES NFR BLD AUTO: 0.3 % — SIGNIFICANT CHANGE UP (ref 0–0.9)
IMM GRANULOCYTES NFR BLD AUTO: 0.4 % — SIGNIFICANT CHANGE UP (ref 0–0.9)
INR BLD: 1.05 RATIO — SIGNIFICANT CHANGE UP (ref 0.85–1.18)
LACTATE BLDV-MCNC: 2.8 MMOL/L — HIGH (ref 0.5–2)
LACTATE SERPL-SCNC: 3.2 MMOL/L — HIGH (ref 0.5–2)
LYMPHOCYTES # BLD AUTO: 0.84 K/UL — LOW (ref 1–3.3)
LYMPHOCYTES # BLD AUTO: 1.67 K/UL — SIGNIFICANT CHANGE UP (ref 1–3.3)
LYMPHOCYTES # BLD AUTO: 18.7 % — SIGNIFICANT CHANGE UP (ref 13–44)
LYMPHOCYTES # BLD AUTO: 6.2 % — LOW (ref 13–44)
MAGNESIUM SERPL-MCNC: 2.2 MG/DL — SIGNIFICANT CHANGE UP (ref 1.6–2.6)
MAGNESIUM SERPL-MCNC: 2.3 MG/DL — SIGNIFICANT CHANGE UP (ref 1.6–2.6)
MCHC RBC-ENTMCNC: 25.9 PG — LOW (ref 27–34)
MCHC RBC-ENTMCNC: 26.1 PG — LOW (ref 27–34)
MCHC RBC-ENTMCNC: 31.8 GM/DL — LOW (ref 32–36)
MCHC RBC-ENTMCNC: 32.1 GM/DL — SIGNIFICANT CHANGE UP (ref 32–36)
MCV RBC AUTO: 81.4 FL — SIGNIFICANT CHANGE UP (ref 80–100)
MCV RBC AUTO: 81.5 FL — SIGNIFICANT CHANGE UP (ref 80–100)
MONOCYTES # BLD AUTO: 0.33 K/UL — SIGNIFICANT CHANGE UP (ref 0–0.9)
MONOCYTES # BLD AUTO: 0.82 K/UL — SIGNIFICANT CHANGE UP (ref 0–0.9)
MONOCYTES NFR BLD AUTO: 2.4 % — SIGNIFICANT CHANGE UP (ref 2–14)
MONOCYTES NFR BLD AUTO: 9.2 % — SIGNIFICANT CHANGE UP (ref 2–14)
NEUTROPHILS # BLD AUTO: 12.36 K/UL — HIGH (ref 1.8–7.4)
NEUTROPHILS # BLD AUTO: 6.2 K/UL — SIGNIFICANT CHANGE UP (ref 1.8–7.4)
NEUTROPHILS NFR BLD AUTO: 69.5 % — SIGNIFICANT CHANGE UP (ref 43–77)
NEUTROPHILS NFR BLD AUTO: 90.9 % — HIGH (ref 43–77)
NRBC # BLD: 0 /100 WBCS — SIGNIFICANT CHANGE UP (ref 0–0)
NRBC # BLD: 0 /100 WBCS — SIGNIFICANT CHANGE UP (ref 0–0)
PHOSPHATE SERPL-MCNC: 3.9 MG/DL — SIGNIFICANT CHANGE UP (ref 2.5–4.5)
PHOSPHATE SERPL-MCNC: 4.3 MG/DL — SIGNIFICANT CHANGE UP (ref 2.5–4.5)
PLATELET # BLD AUTO: 289 K/UL — SIGNIFICANT CHANGE UP (ref 150–400)
PLATELET # BLD AUTO: 303 K/UL — SIGNIFICANT CHANGE UP (ref 150–400)
POTASSIUM SERPL-MCNC: 4.5 MMOL/L — SIGNIFICANT CHANGE UP (ref 3.5–5.3)
POTASSIUM SERPL-MCNC: 4.5 MMOL/L — SIGNIFICANT CHANGE UP (ref 3.5–5.3)
POTASSIUM SERPL-SCNC: 4.5 MMOL/L — SIGNIFICANT CHANGE UP (ref 3.5–5.3)
POTASSIUM SERPL-SCNC: 4.5 MMOL/L — SIGNIFICANT CHANGE UP (ref 3.5–5.3)
PROT SERPL-MCNC: 7.1 G/DL — SIGNIFICANT CHANGE UP (ref 6–8.3)
PROT SERPL-MCNC: 8.2 G/DL — SIGNIFICANT CHANGE UP (ref 6–8.3)
PROTHROM AB SERPL-ACNC: 11 SEC — SIGNIFICANT CHANGE UP (ref 9.5–13)
RBC # BLD: 3.94 M/UL — LOW (ref 4.2–5.8)
RBC # BLD: 4.25 M/UL — SIGNIFICANT CHANGE UP (ref 4.2–5.8)
RBC # FLD: 13.3 % — SIGNIFICANT CHANGE UP (ref 10.3–14.5)
RBC # FLD: 13.4 % — SIGNIFICANT CHANGE UP (ref 10.3–14.5)
RH IG SCN BLD-IMP: POSITIVE — SIGNIFICANT CHANGE UP
SODIUM SERPL-SCNC: 136 MMOL/L — SIGNIFICANT CHANGE UP (ref 135–145)
SODIUM SERPL-SCNC: 136 MMOL/L — SIGNIFICANT CHANGE UP (ref 135–145)
WBC # BLD: 13.6 K/UL — HIGH (ref 3.8–10.5)
WBC # BLD: 8.93 K/UL — SIGNIFICANT CHANGE UP (ref 3.8–10.5)
WBC # FLD AUTO: 13.6 K/UL — HIGH (ref 3.8–10.5)
WBC # FLD AUTO: 8.93 K/UL — SIGNIFICANT CHANGE UP (ref 3.8–10.5)

## 2024-05-01 PROCEDURE — 44310 ILEOSTOMY/JEJUNOSTOMY: CPT

## 2024-05-01 PROCEDURE — 52332 CYSTOSCOPY AND TREATMENT: CPT | Mod: 50

## 2024-05-01 PROCEDURE — 88309 TISSUE EXAM BY PATHOLOGIST: CPT | Mod: 26

## 2024-05-01 PROCEDURE — 88305 TISSUE EXAM BY PATHOLOGIST: CPT | Mod: 26

## 2024-05-01 PROCEDURE — 45330 DIAGNOSTIC SIGMOIDOSCOPY: CPT

## 2024-05-01 PROCEDURE — 44207 L COLECTOMY/COLOPROCTOSTOMY: CPT

## 2024-05-01 DEVICE — URETERAL CATH OPEN END 5FR 70CM: Type: IMPLANTABLE DEVICE | Site: BILATERAL FOR STENTS, ABDOMINAL | Status: FUNCTIONAL

## 2024-05-01 DEVICE — SEPRAFILM 5 X 6": Type: IMPLANTABLE DEVICE | Site: BILATERAL FOR STENTS, ABDOMINAL | Status: FUNCTIONAL

## 2024-05-01 DEVICE — GUIDEWIRE SENSOR DUAL-FLEX NITINOL STRAIGHT .035" X 150CM: Type: IMPLANTABLE DEVICE | Site: BILATERAL FOR STENTS, ABDOMINAL | Status: FUNCTIONAL

## 2024-05-01 DEVICE — STAPLER COVIDIEN TRI-STAPLE 60MM PURPLE RELOAD: Type: IMPLANTABLE DEVICE | Site: BILATERAL FOR STENTS, ABDOMINAL | Status: FUNCTIONAL

## 2024-05-01 DEVICE — STAPLER COVIDIEN CIRCULAR TRI-STAPLE 28MM PURPLE MEDIUM/THICK: Type: IMPLANTABLE DEVICE | Site: BILATERAL FOR STENTS, ABDOMINAL | Status: FUNCTIONAL

## 2024-05-01 DEVICE — LIGATING CLIPS WECK HORIZON LARGE (ORANGE) 6: Type: IMPLANTABLE DEVICE | Site: BILATERAL FOR STENTS, ABDOMINAL | Status: FUNCTIONAL

## 2024-05-01 DEVICE — VISTASEAL FIBRIN HUMAN 4ML: Type: IMPLANTABLE DEVICE | Site: BILATERAL FOR STENTS, ABDOMINAL | Status: FUNCTIONAL

## 2024-05-01 DEVICE — STAPLER COVIDIEN TRI-STAPLE 45MM TAN RELOAD: Type: IMPLANTABLE DEVICE | Site: BILATERAL FOR STENTS, ABDOMINAL | Status: FUNCTIONAL

## 2024-05-01 DEVICE — STAPLER COVIDIEN PURSTRING 65MM: Type: IMPLANTABLE DEVICE | Site: BILATERAL FOR STENTS, ABDOMINAL | Status: FUNCTIONAL

## 2024-05-01 DEVICE — STAPLER ECHELON CONTOUR CURVED CUTTER 40MM WITH (GREEN) RELOAD: Type: IMPLANTABLE DEVICE | Site: BILATERAL FOR STENTS, ABDOMINAL | Status: FUNCTIONAL

## 2024-05-01 RX ORDER — LANOLIN ALCOHOL/MO/W.PET/CERES
3 CREAM (GRAM) TOPICAL AT BEDTIME
Refills: 0 | Status: DISCONTINUED | OUTPATIENT
Start: 2024-05-01 | End: 2024-05-06

## 2024-05-01 RX ORDER — ACETAMINOPHEN 500 MG
1000 TABLET ORAL EVERY 6 HOURS
Refills: 0 | Status: COMPLETED | OUTPATIENT
Start: 2024-05-01 | End: 2024-05-02

## 2024-05-01 RX ORDER — SODIUM CHLORIDE 9 MG/ML
1000 INJECTION, SOLUTION INTRAVENOUS
Refills: 0 | Status: DISCONTINUED | OUTPATIENT
Start: 2024-05-01 | End: 2024-05-06

## 2024-05-01 RX ORDER — ONDANSETRON 8 MG/1
4 TABLET, FILM COATED ORAL EVERY 6 HOURS
Refills: 0 | Status: DISCONTINUED | OUTPATIENT
Start: 2024-05-01 | End: 2024-05-02

## 2024-05-01 RX ORDER — NALOXONE HYDROCHLORIDE 4 MG/.1ML
0.1 SPRAY NASAL
Refills: 0 | Status: DISCONTINUED | OUTPATIENT
Start: 2024-05-01 | End: 2024-05-02

## 2024-05-01 RX ORDER — HEPARIN SODIUM 5000 [USP'U]/ML
5000 INJECTION INTRAVENOUS; SUBCUTANEOUS EVERY 8 HOURS
Refills: 0 | Status: DISCONTINUED | OUTPATIENT
Start: 2024-05-01 | End: 2024-05-06

## 2024-05-01 RX ORDER — MORPHINE SULFATE 50 MG/1
0.15 CAPSULE, EXTENDED RELEASE ORAL ONCE
Refills: 0 | Status: DISCONTINUED | OUTPATIENT
Start: 2024-05-01 | End: 2024-05-02

## 2024-05-01 RX ORDER — SODIUM CHLORIDE 9 MG/ML
1000 INJECTION, SOLUTION INTRAVENOUS
Refills: 0 | Status: DISCONTINUED | OUTPATIENT
Start: 2024-05-01 | End: 2024-05-04

## 2024-05-01 RX ORDER — OXYCODONE HYDROCHLORIDE 5 MG/1
5 TABLET ORAL
Refills: 0 | Status: DISCONTINUED | OUTPATIENT
Start: 2024-05-01 | End: 2024-05-02

## 2024-05-01 RX ORDER — GLUCAGON INJECTION, SOLUTION 0.5 MG/.1ML
1 INJECTION, SOLUTION SUBCUTANEOUS ONCE
Refills: 0 | Status: DISCONTINUED | OUTPATIENT
Start: 2024-05-01 | End: 2024-05-06

## 2024-05-01 RX ORDER — OXYCODONE HYDROCHLORIDE 5 MG/1
2.5 TABLET ORAL EVERY 4 HOURS
Refills: 0 | Status: DISCONTINUED | OUTPATIENT
Start: 2024-05-01 | End: 2024-05-06

## 2024-05-01 RX ORDER — SODIUM CHLORIDE 9 MG/ML
500 INJECTION INTRAMUSCULAR; INTRAVENOUS; SUBCUTANEOUS ONCE
Refills: 0 | Status: COMPLETED | OUTPATIENT
Start: 2024-05-01 | End: 2024-05-01

## 2024-05-01 RX ORDER — DEXTROSE 50 % IN WATER 50 %
15 SYRINGE (ML) INTRAVENOUS ONCE
Refills: 0 | Status: DISCONTINUED | OUTPATIENT
Start: 2024-05-01 | End: 2024-05-06

## 2024-05-01 RX ORDER — ONDANSETRON 8 MG/1
4 TABLET, FILM COATED ORAL ONCE
Refills: 0 | Status: DISCONTINUED | OUTPATIENT
Start: 2024-05-01 | End: 2024-05-01

## 2024-05-01 RX ORDER — DEXTROSE 10 % IN WATER 10 %
125 INTRAVENOUS SOLUTION INTRAVENOUS ONCE
Refills: 0 | Status: DISCONTINUED | OUTPATIENT
Start: 2024-05-01 | End: 2024-05-06

## 2024-05-01 RX ORDER — HYDROMORPHONE HYDROCHLORIDE 2 MG/ML
0.5 INJECTION INTRAMUSCULAR; INTRAVENOUS; SUBCUTANEOUS EVERY 4 HOURS
Refills: 0 | Status: DISCONTINUED | OUTPATIENT
Start: 2024-05-01 | End: 2024-05-06

## 2024-05-01 RX ORDER — NALBUPHINE HYDROCHLORIDE 10 MG/ML
2.5 INJECTION, SOLUTION INTRAMUSCULAR; INTRAVENOUS; SUBCUTANEOUS EVERY 6 HOURS
Refills: 0 | Status: DISCONTINUED | OUTPATIENT
Start: 2024-05-01 | End: 2024-05-02

## 2024-05-01 RX ORDER — OXYCODONE HYDROCHLORIDE 5 MG/1
5 TABLET ORAL EVERY 4 HOURS
Refills: 0 | Status: DISCONTINUED | OUTPATIENT
Start: 2024-05-01 | End: 2024-05-06

## 2024-05-01 RX ORDER — DEXTROSE 50 % IN WATER 50 %
25 SYRINGE (ML) INTRAVENOUS ONCE
Refills: 0 | Status: DISCONTINUED | OUTPATIENT
Start: 2024-05-01 | End: 2024-05-06

## 2024-05-01 RX ORDER — OXYCODONE HYDROCHLORIDE 5 MG/1
10 TABLET ORAL
Refills: 0 | Status: DISCONTINUED | OUTPATIENT
Start: 2024-05-01 | End: 2024-05-02

## 2024-05-01 RX ORDER — FENTANYL CITRATE 50 UG/ML
50 INJECTION INTRAVENOUS
Refills: 0 | Status: DISCONTINUED | OUTPATIENT
Start: 2024-05-01 | End: 2024-05-01

## 2024-05-01 RX ORDER — DEXTROSE 50 % IN WATER 50 %
12.5 SYRINGE (ML) INTRAVENOUS ONCE
Refills: 0 | Status: DISCONTINUED | OUTPATIENT
Start: 2024-05-01 | End: 2024-05-06

## 2024-05-01 RX ORDER — AMLODIPINE BESYLATE 2.5 MG/1
5 TABLET ORAL DAILY
Refills: 0 | Status: DISCONTINUED | OUTPATIENT
Start: 2024-05-01 | End: 2024-05-06

## 2024-05-01 RX ORDER — INSULIN LISPRO 100/ML
VIAL (ML) SUBCUTANEOUS
Refills: 0 | Status: DISCONTINUED | OUTPATIENT
Start: 2024-05-01 | End: 2024-05-06

## 2024-05-01 RX ADMIN — SODIUM CHLORIDE 40 MILLILITER(S): 9 INJECTION, SOLUTION INTRAVENOUS at 15:21

## 2024-05-01 RX ADMIN — Medication 1 ENEMA: at 03:49

## 2024-05-01 RX ADMIN — Medication 1 ENEMA: at 04:50

## 2024-05-01 RX ADMIN — AMLODIPINE BESYLATE 5 MILLIGRAM(S): 2.5 TABLET ORAL at 05:34

## 2024-05-01 RX ADMIN — Medication 2: at 15:15

## 2024-05-01 RX ADMIN — Medication 400 MILLIGRAM(S): at 19:50

## 2024-05-01 RX ADMIN — SODIUM CHLORIDE 500 MILLILITER(S): 9 INJECTION INTRAMUSCULAR; INTRAVENOUS; SUBCUTANEOUS at 15:59

## 2024-05-01 RX ADMIN — HEPARIN SODIUM 5000 UNIT(S): 5000 INJECTION INTRAVENOUS; SUBCUTANEOUS at 21:20

## 2024-05-01 RX ADMIN — SODIUM CHLORIDE 500 MILLILITER(S): 9 INJECTION INTRAMUSCULAR; INTRAVENOUS; SUBCUTANEOUS at 19:06

## 2024-05-01 RX ADMIN — Medication 1000 MILLIGRAM(S): at 20:12

## 2024-05-01 RX ADMIN — SODIUM CHLORIDE 500 MILLILITER(S): 9 INJECTION INTRAMUSCULAR; INTRAVENOUS; SUBCUTANEOUS at 20:41

## 2024-05-01 NOTE — CHART NOTE - NSCHARTNOTEFT_GEN_A_CORE
Post Operative Note  Patient: NEHEMIAS GARCIA 79y (1945) Male   MRN: [id number]  Location: [facility] PACU 11  Visit: 04-22-24 Inpatient  Date: 05-01-24 @ 18:30    Procedure: S/P Lap LAR, with DLI creation     Subjective:   Patient seen and examined at bedside. Patient tolerated a small amount of liquid. Patient denies nausea, vomiting, chest pain, SOB.     Objective:  Vitals: T(F): 97.7 (05-01-24 @ 06:30), Max: 98.5 (04-30-24 @ 21:39)  HR: 61 (05-01-24 @ 19:00)  BP: 113/63 (05-01-24 @ 19:00) (107/62 - 160/76)  RR: 20 (05-01-24 @ 19:00)  SpO2: 98% (05-01-24 @ 19:00)  Vent Settings:     In:   04-30-24 @ 07:01  -  05-01-24 @ 07:00  --------------------------------------------------------  IN: 480 mL    05-01-24 @ 07:01  -  05-01-24 @ 19:42  --------------------------------------------------------  IN: 840 mL      IV Fluids: dextrose 10% Bolus 125 milliLiter(s) IV Bolus once  dextrose 5%. 1000 milliLiter(s) (100 mL/Hr) IV Continuous <Continuous>  dextrose 5%. 1000 milliLiter(s) (50 mL/Hr) IV Continuous <Continuous>  lactated ringers. 1000 milliLiter(s) (40 mL/Hr) IV Continuous <Continuous>      Out:   04-30-24 @ 07:01  -  05-01-24 @ 07:00  --------------------------------------------------------  OUT: 1950 mL    05-01-24 @ 07:01  -  05-01-24 @ 19:42  --------------------------------------------------------  OUT: 155 mL      EBL:   04-30-24 @ 07:01  -  05-01-24 @ 07:00  --------------------------------------------------------  OUT: 0 mL      Voided Urine:   04-30-24 @ 07:01  -  05-01-24 @ 07:00  --------------------------------------------------------  OUT: 1950 mL    05-01-24 @ 07:01  -  05-01-24 @ 19:42  --------------------------------------------------------  OUT: 155 mL      Clark Catheter: yes no   Drains:   SWATHI:    ,   Chest Tube:      NG Tube:         Physical Examination:  General: NAD, resting comfortably in bed  Respiratory: Nonlabored respirations  Cardio: pulse present  Abdomen: soft distended, appropriately tender, surgical incisions are c/d/i. NGT/OGT*  Vascular: extremities are warm and well perfused.     Imaging:  No post-op imaging studies    Assessment:  79yMale patient S/P Lap LAR, with DLI creation on 5/1     Plan:  - post op lactate elevated to 2.8. S/p 500 cc bolus lactate 3.2. Will give another 500 cc bolus, and f/u repeat lactate.  - Pain control PRN  - Diet: CLD  - Activity: as tolerated   - DVT ppx    Date/Time: 05-01-24 @ 18:30    Surgery Green Team  [id number] Post Operative Note  Patient: NEHEMIAS GARCIA 79y (1945) Male   MRN: [id number]  Location: [facility] PACU 11  Visit: 04-22-24 Inpatient  Date: 05-01-24 @ 18:30    Procedure: S/P Lap LAR, with DLI creation     Subjective:   Patient seen and examined at bedside. Patient tolerated a small amount of liquid. Patient denies nausea, vomiting, chest pain, SOB.     Objective:  Vitals: T(F): 97.7 (05-01-24 @ 06:30), Max: 98.5 (04-30-24 @ 21:39)  HR: 61 (05-01-24 @ 19:00)  BP: 113/63 (05-01-24 @ 19:00) (107/62 - 160/76)  RR: 20 (05-01-24 @ 19:00)  SpO2: 98% (05-01-24 @ 19:00)  Vent Settings:     In:   04-30-24 @ 07:01  -  05-01-24 @ 07:00  --------------------------------------------------------  IN: 480 mL    05-01-24 @ 07:01  -  05-01-24 @ 19:42  --------------------------------------------------------  IN: 840 mL      IV Fluids: dextrose 10% Bolus 125 milliLiter(s) IV Bolus once  dextrose 5%. 1000 milliLiter(s) (100 mL/Hr) IV Continuous <Continuous>  dextrose 5%. 1000 milliLiter(s) (50 mL/Hr) IV Continuous <Continuous>  lactated ringers. 1000 milliLiter(s) (40 mL/Hr) IV Continuous <Continuous>      Out:   04-30-24 @ 07:01  -  05-01-24 @ 07:00  --------------------------------------------------------  OUT: 1950 mL    05-01-24 @ 07:01  -  05-01-24 @ 19:42  --------------------------------------------------------  OUT: 155 mL      EBL:   04-30-24 @ 07:01  -  05-01-24 @ 07:00  --------------------------------------------------------  OUT: 0 mL      Voided Urine:   04-30-24 @ 07:01  -  05-01-24 @ 07:00  --------------------------------------------------------  OUT: 1950 mL    05-01-24 @ 07:01  -  05-01-24 @ 19:42  --------------------------------------------------------  OUT: 155 mL      Clark Catheter: yes no   Drains:   SWATHI:    ,   Chest Tube:      NG Tube:         Physical Examination:  General: NAD, resting comfortably in bed  Respiratory: Nonlabored respirations  Cardio: pulse present  Abdomen: soft distended, appropriately tender, surgical incisions are c/d/i. ostomy with minimal output  Vascular: extremities are warm and well perfused.     Imaging:  No post-op imaging studies    Assessment:  79yMale patient S/P Lap LAR, with DLI creation on 5/1     Plan:  - post op lactate elevated to 2.8. S/p 500 cc bolus lactate 3.2. Will give another 500 cc bolus, and f/u repeat lactate.  - Pain control PRN  - Diet: CLD  - Activity: as tolerated   - DVT ppx    Date/Time: 05-01-24 @ 18:30    Surgery Green Team  [phone]

## 2024-05-01 NOTE — PRE-ANESTHESIA EVALUATION ADULT - NSANTHOSAYNRD_GEN_A_CORE
No. BEATRIS screening performed.  STOP BANG Legend: 0-2 = LOW Risk; 3-4 = INTERMEDIATE Risk; 5-8 = HIGH Risk
No. BEATRIS screening performed.  STOP BANG Legend: 0-2 = LOW Risk; 3-4 = INTERMEDIATE Risk; 5-8 = HIGH Risk

## 2024-05-01 NOTE — PRE-ANESTHESIA EVALUATION ADULT - NSRADCARDRESULTSFT_GEN_ALL_CORE
< from: TTE W or WO Ultrasound Enhancing Agent (04.26.24 @ 09:54) >    CONCLUSIONS:      1. Left ventricular cavity is normal in size. Left ventricular wall thickness is normal. Left ventricular systolic function is normal with an ejection fraction of 57 % by Beckett's method of disks. There are no regional wall motion abnormalities seen.   2. Left ventricular global longitudinal strain is -22.8 % which is normal (< -18%). Images were acquired on a [employer] ultrasound system and processed using Bizak strain analysis software with a heart rate of 80 bpm and a blood pressure of 125/62 mmHg.   3. Normal left ventricular diastolic function, with normal filling pressure.   4. Normal right ventricular cavity size, with normal wall thickness, and normal systolic function.   5. Normal left and right atrial size.   6. No significant valvular disease.   7. No pericardial effusion seen.   8. No prior echocardiogram is available for comparison.    ________________________________________________________________________________________  FINDINGS:     Left Ventricle:  The left ventricular cavity is normalin size. Left ventricular wall thickness is normal. Left ventricular systolic function is normal with a calculated ejection fraction of 57 % by the Beckett's biplane method of disks. There are no regional wall motion abnormalities seen. There is normal left ventricular diastolic function, with normal filling pressure. Left ventricular global longitudinal strain is -22.8 % which is normal (< -18%). Images were acquired on a [employer] ultrasound system and processed using Bizak strain analysis software with a heart rate of 80 bpm and a blood pressure of 125/62 mmHg.     Right Ventricle:  The right ventricular cavity is normal in size, with normal wall thickness and normal systolic function. Tricuspid annular plane systolic excursion (TAPSE) is 2.0 cm (normal >=1.7 cm).     Left Atrium:  The left atrium is normal in size with an indexed volume of 22.35 ml/m².     Right Atrium:  The right atrium is normal in size.     Interatrial Septum:  The interatrial septum appears intact.     Aortic Valve:  The aortic valve appears trileaflet with normal systolic excursion. There is calcification of the aortic valve leaflets. There is no aortic valve stenosis. There is no evidence of aortic regurgitation.     Mitral Valve:  Structurally normal mitralvalve with normal leaflet excursion. There is normal leaflet mobility of the mitral valve. There is calcification of the mitral valve annulus. There is no mitral valve stenosis. There is trace mitral regurgitation.     Tricuspid Valve:  Structurally normal tricuspid valve with normal leaflet excursion. There is trace tricuspid regurgitation.     Pulmonic Valve:  Structurally normal pulmonic valve with normal leaflet excursion. There is trace pulmonic regurgitation.     Aorta:  The aortic root appears normal in size.     Pericardium:  No pericardial effusion seen.     Systemic Veins:  The inferior vena cava is normal in size (normal <2.1cm) with normal inspiratory collapse (normal >50%) consistent with normal right atrial pressure (~3, range 0-5mmHg).    < end of copied text >

## 2024-05-01 NOTE — BRIEF OPERATIVE NOTE - NSICDXBRIEFPROCEDURE_GEN_ALL_CORE_FT
PROCEDURES:  Laparoscopic low anterior resection 01-May-2024 14:43:22  Vincent Liz  Ileostomy 01-May-2024 14:43:30  Vincent Liz

## 2024-05-01 NOTE — PROVIDER CONTACT NOTE (CRITICAL VALUE NOTIFICATION) - ACTION/TREATMENT ORDERED:
IVB 500CC TO BE GIVEN AS ORDER
Render Note In Bullet Format When Appropriate: No
Show Applicator Variable?: Yes
Detail Level: Detailed
Post-Care Instructions: I reviewed with the patient in detail post-care instructions. Patient is to wear sunprotection, and avoid picking at any of the treated lesions. Pt may apply Vaseline to crusted or scabbing areas.
Duration Of Freeze Thaw-Cycle (Seconds): 10
Number Of Freeze-Thaw Cycles: 2 freeze-thaw cycles
Consent: The patient's consent was obtained including but not limited to risks of crusting, scabbing, blistering, scarring, darker or lighter pigmentary change, recurrence, incomplete removal and infection.

## 2024-05-01 NOTE — BRIEF OPERATIVE NOTE - OPERATION/FINDINGS
Rectum irrigated prior to starting surgery. Sigmoid and upper rectum mobilized laparoscopically. Colon externalized through a lower midline incision. Mass palpated and colon transected 10 cm proximal. Dissection carried several centimeters beyond mass; confirmed with flexible sigmoidoscopy. Distal colon divided with the contour stapler. EEA anvil placed into the sigmoid colon and attached with a purse-string device. Sizing tool introduced into the rectum, followed by the EEA stapler. Following staple, pelvis filled with saline and sigmoidoscopy repeated; bubbles noted, so additional sutures placed at staple line. Repeat leak test negative. Staple line covered with Vistaseal. Loop of distal ileum brought up through a RLQ incision; diverting loop ileostomy created in Estefany fashion.

## 2024-05-01 NOTE — BRIEF OPERATIVE NOTE - NSICDXBRIEFPOSTOP_GEN_ALL_CORE_FT
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

## 2024-05-01 NOTE — PRE-ANESTHESIA EVALUATION ADULT - NSANTHAIRWAYFT_ENT_ALL_CORE
FROM of neck   TMD>3FB  Mouth opening >2FB
6cm thyromental distance  Adequate interincisor distance  FROM of neck

## 2024-05-01 NOTE — PRE-ANESTHESIA EVALUATION ADULT - NSANTHPMHFT_GEN_ALL_CORE
79M with history of prostate cancer s/p radiation, now with BRPBR and colonic thickening on CT scan  Patient states that he may have had a small remote heart attack but does not have stents, he thinks he had a remote TTE that was normal. He does have LBBB on EKG.
79M PMHx HTN, T2DM, h/o prostate ca s/p RT. He's currently visiting from [place] presenting with BRBPR s/p colonoscopy

## 2024-05-02 LAB
ADD ON TEST-SPECIMEN IN LAB: SIGNIFICANT CHANGE UP
ALBUMIN SERPL ELPH-MCNC: 3.6 G/DL — SIGNIFICANT CHANGE UP (ref 3.3–5)
ALP SERPL-CCNC: 65 U/L — SIGNIFICANT CHANGE UP (ref 40–120)
ALT FLD-CCNC: 123 U/L — HIGH (ref 10–45)
ANION GAP SERPL CALC-SCNC: 13 MMOL/L — SIGNIFICANT CHANGE UP (ref 5–17)
AST SERPL-CCNC: 207 U/L — HIGH (ref 10–40)
BILIRUB DIRECT SERPL-MCNC: <0.1 MG/DL — SIGNIFICANT CHANGE UP (ref 0–0.3)
BILIRUB INDIRECT FLD-MCNC: >0.3 MG/DL — SIGNIFICANT CHANGE UP (ref 0.2–1)
BILIRUB SERPL-MCNC: 0.4 MG/DL — SIGNIFICANT CHANGE UP (ref 0.2–1.2)
BUN SERPL-MCNC: 17 MG/DL — SIGNIFICANT CHANGE UP (ref 7–23)
CALCIUM SERPL-MCNC: 8.5 MG/DL — SIGNIFICANT CHANGE UP (ref 8.4–10.5)
CHLORIDE SERPL-SCNC: 102 MMOL/L — SIGNIFICANT CHANGE UP (ref 96–108)
CO2 SERPL-SCNC: 21 MMOL/L — LOW (ref 22–31)
CREAT SERPL-MCNC: 0.87 MG/DL — SIGNIFICANT CHANGE UP (ref 0.5–1.3)
EGFR: 88 ML/MIN/1.73M2 — SIGNIFICANT CHANGE UP
GLUCOSE BLDC GLUCOMTR-MCNC: 106 MG/DL — HIGH (ref 70–99)
GLUCOSE BLDC GLUCOMTR-MCNC: 117 MG/DL — HIGH (ref 70–99)
GLUCOSE BLDC GLUCOMTR-MCNC: 133 MG/DL — HIGH (ref 70–99)
GLUCOSE BLDC GLUCOMTR-MCNC: 175 MG/DL — HIGH (ref 70–99)
GLUCOSE SERPL-MCNC: 133 MG/DL — HIGH (ref 70–99)
HCT VFR BLD CALC: 29.8 % — LOW (ref 39–50)
HGB BLD-MCNC: 9.5 G/DL — LOW (ref 13–17)
LACTATE SERPL-SCNC: 1.8 MMOL/L — SIGNIFICANT CHANGE UP (ref 0.5–2)
MAGNESIUM SERPL-MCNC: 2.1 MG/DL — SIGNIFICANT CHANGE UP (ref 1.6–2.6)
MCHC RBC-ENTMCNC: 26.2 PG — LOW (ref 27–34)
MCHC RBC-ENTMCNC: 31.9 GM/DL — LOW (ref 32–36)
MCV RBC AUTO: 82.1 FL — SIGNIFICANT CHANGE UP (ref 80–100)
NRBC # BLD: 0 /100 WBCS — SIGNIFICANT CHANGE UP (ref 0–0)
PHOSPHATE SERPL-MCNC: 4.7 MG/DL — HIGH (ref 2.5–4.5)
PLATELET # BLD AUTO: 277 K/UL — SIGNIFICANT CHANGE UP (ref 150–400)
POTASSIUM SERPL-MCNC: 4.4 MMOL/L — SIGNIFICANT CHANGE UP (ref 3.5–5.3)
POTASSIUM SERPL-SCNC: 4.4 MMOL/L — SIGNIFICANT CHANGE UP (ref 3.5–5.3)
PROT SERPL-MCNC: 6.5 G/DL — SIGNIFICANT CHANGE UP (ref 6–8.3)
RBC # BLD: 3.63 M/UL — LOW (ref 4.2–5.8)
RBC # FLD: 13.4 % — SIGNIFICANT CHANGE UP (ref 10.3–14.5)
SODIUM SERPL-SCNC: 136 MMOL/L — SIGNIFICANT CHANGE UP (ref 135–145)
WBC # BLD: 11.51 K/UL — HIGH (ref 3.8–10.5)
WBC # FLD AUTO: 11.51 K/UL — HIGH (ref 3.8–10.5)

## 2024-05-02 RX ORDER — MAGNESIUM OXIDE 400 MG ORAL TABLET 241.3 MG
1000 TABLET ORAL
Refills: 0 | Status: DISCONTINUED | OUTPATIENT
Start: 2024-05-02 | End: 2024-05-02

## 2024-05-02 RX ORDER — KETOROLAC TROMETHAMINE 30 MG/ML
15 SYRINGE (ML) INJECTION EVERY 8 HOURS
Refills: 0 | Status: DISCONTINUED | OUTPATIENT
Start: 2024-05-02 | End: 2024-05-04

## 2024-05-02 RX ORDER — ACETAMINOPHEN 500 MG
1000 TABLET ORAL EVERY 6 HOURS
Refills: 0 | Status: DISCONTINUED | OUTPATIENT
Start: 2024-05-02 | End: 2024-05-06

## 2024-05-02 RX ORDER — LATANOPROST 0.05 MG/ML
1 SOLUTION/ DROPS OPHTHALMIC; TOPICAL DAILY
Refills: 0 | Status: DISCONTINUED | OUTPATIENT
Start: 2024-05-02 | End: 2024-05-06

## 2024-05-02 RX ORDER — DORZOLAMIDE HYDROCHLORIDE TIMOLOL MALEATE 20; 5 MG/ML; MG/ML
1 SOLUTION/ DROPS OPHTHALMIC EVERY 12 HOURS
Refills: 0 | Status: DISCONTINUED | OUTPATIENT
Start: 2024-05-02 | End: 2024-05-06

## 2024-05-02 RX ADMIN — HEPARIN SODIUM 5000 UNIT(S): 5000 INJECTION INTRAVENOUS; SUBCUTANEOUS at 13:02

## 2024-05-02 RX ADMIN — Medication 1: at 17:04

## 2024-05-02 RX ADMIN — Medication 15 MILLIGRAM(S): at 13:01

## 2024-05-02 RX ADMIN — Medication 400 MILLIGRAM(S): at 01:39

## 2024-05-02 RX ADMIN — MAGNESIUM OXIDE 400 MG ORAL TABLET 1000 MILLIGRAM(S): 241.3 TABLET ORAL at 09:42

## 2024-05-02 RX ADMIN — Medication 15 MILLIGRAM(S): at 21:03

## 2024-05-02 RX ADMIN — HEPARIN SODIUM 5000 UNIT(S): 5000 INJECTION INTRAVENOUS; SUBCUTANEOUS at 21:03

## 2024-05-02 RX ADMIN — Medication 15 MILLIGRAM(S): at 21:18

## 2024-05-02 RX ADMIN — Medication 1000 MILLIGRAM(S): at 08:50

## 2024-05-02 RX ADMIN — Medication 1000 MILLIGRAM(S): at 17:35

## 2024-05-02 RX ADMIN — DORZOLAMIDE HYDROCHLORIDE TIMOLOL MALEATE 1 DROP(S): 20; 5 SOLUTION/ DROPS OPHTHALMIC at 21:02

## 2024-05-02 RX ADMIN — HEPARIN SODIUM 5000 UNIT(S): 5000 INJECTION INTRAVENOUS; SUBCUTANEOUS at 05:42

## 2024-05-02 RX ADMIN — Medication 400 MILLIGRAM(S): at 08:20

## 2024-05-02 RX ADMIN — Medication 1000 MILLIGRAM(S): at 01:55

## 2024-05-02 RX ADMIN — Medication 400 MILLIGRAM(S): at 17:05

## 2024-05-02 RX ADMIN — OXYCODONE HYDROCHLORIDE 5 MILLIGRAM(S): 5 TABLET ORAL at 08:21

## 2024-05-02 RX ADMIN — OXYCODONE HYDROCHLORIDE 2.5 MILLIGRAM(S): 5 TABLET ORAL at 03:40

## 2024-05-02 RX ADMIN — OXYCODONE HYDROCHLORIDE 2.5 MILLIGRAM(S): 5 TABLET ORAL at 04:40

## 2024-05-02 RX ADMIN — Medication 15 MILLIGRAM(S): at 13:31

## 2024-05-02 RX ADMIN — LATANOPROST 1 DROP(S): 0.05 SOLUTION/ DROPS OPHTHALMIC; TOPICAL at 17:54

## 2024-05-02 RX ADMIN — OXYCODONE HYDROCHLORIDE 5 MILLIGRAM(S): 5 TABLET ORAL at 08:50

## 2024-05-02 NOTE — ADVANCED PRACTICE NURSE CONSULT - RECOMMEDATIONS
Will recommend:  1. Encourage self care -participation w/ emptying .  2. Empty pouch when 1/3-1/2 full   3. Change pouching system every 3-4 days & prn leakage  4. Reinforce ostomy teaching w/patient  5. Contact ostomy specialists if questions, concerns/issues .  6. Supplies: Junito 2 1/4" Ceraplus skin barrier (#[id number]), Junito 2 1/4" drainable pouch (#[id number]); Accessory products:  stoma paste #[id number], stoma powder (#9696) & Cavilon No sting barrier film wipe (#[id number])  Will f/u. Support & encouragement provided throughout visit.

## 2024-05-02 NOTE — PHYSICAL THERAPY INITIAL EVALUATION ADULT - PERTINENT HX OF CURRENT PROBLEM, REHAB EVAL
79 year old male admitted to [facility] on 4/22/24  with  PMH HTN, DM, prostate ca s/p RT presents with hematochezia found to have concern for colonic malignancy on CT, no liver lesions, colonoscopy revealed rectosigmoid mass 15cm from anal verge nonobstructive no synchronous lesions, general surgery planning on doing a colonic resection 4/1 and asked for urology to be present for BL ureteral catheters.     Patient was diagnosed with prostate cancer in [place] and had radiation 1 year ago, reports urologist checked PSA recently and levels were undetectable. UA and urine cx negative. Denies any urologic symptoms. 79 year old male admitted to [facility] on 4/22/24  with  PMH HTN, DM, prostate ca s/p RT presents with hematochezia found to have concern for colonic malignancy on CT, no liver lesions, colonoscopy revealed rectosigmoid mass 15cm from anal verge nonobstructive no synchronous lesions, general surgery planning on doing a colonic resection 4/1 and asked for urology to be present for BL ureteral catheters.     Patient was diagnosed with prostate cancer in [place] and had radiation 1 year ago, reports urologist checked PSA recently and levels were undetectable. UA and urine cx negative. Denies any urologic symptoms. 4/24 colonoscopy, on 5/1 s/p LAR 79 year old male admitted to [facility] on 4/22/24  with  PMH HTN, DM, prostate ca s/p RT presents with hematochezia found to have concern for colonic malignancy on CT, no liver lesions, colonoscopy revealed rectosigmoid mass 15cm from anal verge nonobstructive no synchronous lesions, general surgery planning on doing a colonic resection 4/1 and asked for urology to be present for BL ureteral catheters.     Patient was diagnosed with prostate cancer in [place] and had radiation 1 year ago, reports urologist checked PSA recently and levels were undetectable. UA and urine cx negative. Denies any urologic symptoms. 4/24 colonoscopy, on 5/1 s/p LAR/ elevated lactate in PACU and given fluids 79 year old male admitted to [facility] on 4/22/24  with  PMH HTN, DM, prostate ca s/p RT presents with hematochezia found to have concern for colonic malignancy on CT, no liver lesions, colonoscopy revealed rectosigmoid mass 15cm from anal verge nonobstructive no synchronous lesions, general surgery planning on doing a colonic resection 4/1 and asked for urology to be present for BL ureteral catheters.     Patient was diagnosed with prostate cancer in [place] and had radiation 1 year ago, reports urologist checked PSA recently and levels were undetectable. UA and urine cx negative. Denies any urologic symptoms. 4/24 colonoscopy, on 5/1 s/p LAR/DLI elevated lactate in PACU and given fluids

## 2024-05-02 NOTE — PROGRESS NOTE ADULT - SUBJECTIVE AND OBJECTIVE BOX
Day 1 of Anesthesia Pain Management Service    SUBJECTIVE: Doing ok  Pain Scale Score:          [X] Refer to charted pain scores    THERAPY:    s/p    150 mcg PF morphine on 5\1\2024      MEDICATIONS  (STANDING):  acetaminophen     Tablet .. 1000 milliGRAM(s) Oral every 6 hours  acetaminophen   IVPB .. 1000 milliGRAM(s) IV Intermittent every 6 hours  amLODIPine   Tablet 5 milliGRAM(s) Oral daily  dextrose 10% Bolus 125 milliLiter(s) IV Bolus once  dextrose 5%. 1000 milliLiter(s) (50 mL/Hr) IV Continuous <Continuous>  dextrose 5%. 1000 milliLiter(s) (100 mL/Hr) IV Continuous <Continuous>  dextrose 50% Injectable 25 Gram(s) IV Push once  dextrose 50% Injectable 12.5 Gram(s) IV Push once  glucagon  Injectable 1 milliGRAM(s) IntraMuscular once  heparin   Injectable 5000 Unit(s) SubCutaneous every 8 hours  insulin lispro (ADMELOG) corrective regimen sliding scale   SubCutaneous Before meals and at bedtime  lactated ringers. 1000 milliLiter(s) (40 mL/Hr) IV Continuous <Continuous>  magnesium oxide 1000 milliGRAM(s) Oral two times a day with meals  morphine PF Spinal 0.15 milliGRAM(s) IntraThecal. once    MEDICATIONS  (PRN):  dextrose Oral Gel 15 Gram(s) Oral once PRN Blood Glucose LESS THAN 70 milliGRAM(s)/deciliter  HYDROmorphone  Injectable 0.5 milliGRAM(s) IV Push every 4 hours PRN breakthrough  melatonin 3 milliGRAM(s) Oral at bedtime PRN Insomnia  nalbuphine Injectable 2.5 milliGRAM(s) IV Push every 6 hours PRN Pruritus  naloxone Injectable 0.1 milliGRAM(s) IV Push every 3 minutes PRN For ANY of the following changes in patient status:  A. RR LESS THAN 10 breaths per minute, B. Oxygen saturation LESS THAN 90%, C. Sedation score of 6  ondansetron Injectable 4 milliGRAM(s) IV Push every 6 hours PRN Nausea  oxyCODONE    IR 2.5 milliGRAM(s) Oral every 4 hours PRN Moderate Pain (4 - 6)  oxyCODONE    IR 10 milliGRAM(s) Oral every 3 hours PRN Moderate Pain (4 - 6)  oxyCODONE    IR 5 milliGRAM(s) Oral every 3 hours PRN Mild Pain (1 - 3)  oxyCODONE    IR 5 milliGRAM(s) Oral every 4 hours PRN Severe Pain (7 - 10)      OBJECTIVE:    Sedation:        	[X] Alert	 [ ] Drowsy	[ ] Arousable      [ ] Asleep       [ ] Unresponsive    Side Effects:	[X] None 	[ ] Nausea	[ ] Vomiting         [ ] Pruritus  		[ ] Weakness            [ ] Numbness	          [ ] Other:    Vital Signs Last 24 Hrs  T(C): 36.7 (02 May 2024 08:25), Max: 36.9 (02 May 2024 05:39)  T(F): 98 (02 May 2024 08:25), Max: 98.5 (02 May 2024 05:39)  HR: 59 (02 May 2024 08:25) (54 - 75)  BP: 144/66 (02 May 2024 08:25) (107/62 - 144/66)  BP(mean): 99 (01 May 2024 19:35) (79 - 99)  RR: 18 (02 May 2024 08:25) (12 - 20)  SpO2: 97% (02 May 2024 08:25) (95% - 100%)    Parameters below as of 02 May 2024 08:25  Patient On (Oxygen Delivery Method): nasal cannula  O2 Flow (L/min): 2      ASSESSMENT/ PLAN  [X] Patient transitioned to prn analgesics  [X] Pain management per primary service, pain service to sign off   [X]Documentation and Verification of current medications

## 2024-05-02 NOTE — PROGRESS NOTE ADULT - ASSESSMENT
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 S/P Lap LAR, with DLI creation on 5/1     Plan:  - Pain control PRN  - Diet: CLD  - Activity: as tolerated   - DVT ppx    Surgery Green Team  [phone]. 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 S/P Lap LAR, with DLI creation on 5/1     Plan:  - ERP  - Pain control PRN  - Diet: CLD, possible advancement to LRD  - Activity: as tolerated   - D/c Clark, TOV  - DVT ppx  - PT eval    Surgery Green Team  [phone].

## 2024-05-02 NOTE — ADVANCED PRACTICE NURSE CONSULT - ASSESSMENT
In @bedside w/pt to initiate stoma teaching, Pt is known to ostomy team from pre op stoma site marking & education with daughter Jeane in [place]. Chart reviewed &events noted. Pt in bed awake &alert, no c/o pain, niece Anamaria at bedside. Re-introduced self &role of COCN. Pt & niece w/good understanding of surgical procedure including "the bag". Reviewed anatomy& function& basics of stoma care. Demonstrated pouching system opening/ closure. Pt practiced readily with pouch, while niece observed. Discussed returning to "normalcy " w/stoma creation ( clothing,shower , bathing activity, lifestyle ,etc).Pt & niece receptive to teaching & expresses understanding. Niece asked appropriate questions. Discussed steps for pouch emptying. Pouch seal intact ;stoma pink & viable & functioning +liquid stool. Ileostomy  educational materials & supplies provided. Pt /niece encourage to review materials, practice w/ pouch & emptying.    Returned in the afternoon. Complete pouching system changed due to leakage. Pt observed steps for change & with encouragement, assisted w/ snapping pouch to skin barrier & closing pouch end. Stoma 1 1/2" pink& viable, draining yellow liquid stoo. Peristomal skin & mucocutaneous junction intact. Noted abd is very soft /flabby, + creases/skin indents at 3&9 o'clock. Pt reports 100lbs weight loss. Repouched w/ 2 1/4" soft convex barrier, paste& drainable pouch. Recommend use of stoma belt to add extra support at 3& 9 o'clock position, but pt declined at this time. "leave it there, maybe I'll try it later." Midline surgical wound dressing changed due to saturation of serousanguinous drainage. Encouraged pt to review educ'l materials as able. Supplies at bedside. Discussed plans w/ staff RN.

## 2024-05-02 NOTE — PHYSICAL THERAPY INITIAL EVALUATION ADULT - NSPTDISCHREC_GEN_A_CORE
No skilled PT needs A-T Advancement Flap Text: The defect edges were debeveled with a #15 scalpel blade.  Given the location of the defect, shape of the defect and the proximity to free margins an A-T advancement flap was deemed most appropriate.  Using a sterile surgical marker, an appropriate advancement flap was drawn incorporating the defect and placing the expected incisions within the relaxed skin tension lines where possible.    The area thus outlined was incised deep to adipose tissue with a #15 scalpel blade.  The skin margins were undermined to an appropriate distance in all directions utilizing iris scissors.

## 2024-05-02 NOTE — PHYSICAL THERAPY INITIAL EVALUATION ADULT - ADDITIONAL COMMENTS
lives in lives in [place] w/ dtr and staying here  w/ family in private  3 level home w/ 10 steps to enter w/ handrails, glasses all the time, hearing good, pt is right handed and did not use assistive device

## 2024-05-02 NOTE — PHYSICAL THERAPY INITIAL EVALUATION ADULT - PLANNED THERAPY INTERVENTIONS, PT EVAL
Goal Pt will negotiate one flight of steps w /one handrail 2 weeks/bed mobility training/gait training/strengthening/transfer training

## 2024-05-02 NOTE — PHYSICAL THERAPY INITIAL EVALUATION ADULT - MUSCLE TONE ASSESSMENT, REHAB EVAL
Patient complaining of lower left side pain with frequent urination and lower abdominal pressure. There is no burning. This has been going on for the last 5 days. A urine dip determined that the patient does not have a UTI. Per Dr. Tong, the patient was referred to the Urologist to treatment. Patient voices understanding of instructions and will make an appointment as soon as possible.    normal

## 2024-05-02 NOTE — PHYSICAL THERAPY INITIAL EVALUATION ADULT - GENERAL OBSERVATIONS, REHAB EVAL
Rec'd in Rec'd in bed, + IV lock, ileostomy, family present, Rec'd in bed, + IV lock, ileostomy,+ foely /   on masimo, family present,

## 2024-05-02 NOTE — PROGRESS NOTE ADULT - SUBJECTIVE AND OBJECTIVE BOX
Surgery Progress Note     24hour Events: 500cc bolus given in pacu due to elevated lacte. Repeat lactate was 3.2 from 2.8. 1L additional bolus given, and lactate improved to 1.8    Subjective:  Patient seen and examined.       Vital Signs:  Vital Signs Last 24 Hrs  T(C): 36.7 (02 May 2024 00:26), Max: 36.8 (01 May 2024 05:20)  T(F): 98 (02 May 2024 00:26), Max: 98.3 (01 May 2024 21:29)  HR: 61 (02 May 2024 00:26) (54 - 77)  BP: 121/51 (02 May 2024 00:26) (107/62 - 141/69)  BP(mean): 99 (01 May 2024 19:35) (79 - 99)  RR: 18 (02 May 2024 00:26) (12 - 20)  SpO2: 96% (02 May 2024 00:26) (95% - 100%)    Parameters below as of 02 May 2024 00:26  Patient On (Oxygen Delivery Method): room air  O2 Flow (L/min): 2      CAPILLARY BLOOD GLUCOSE      POCT Blood Glucose.: 150 mg/dL (01 May 2024 21:07)  POCT Blood Glucose.: 206 mg/dL (01 May 2024 15:00)  POCT Blood Glucose.: 133 mg/dL (01 May 2024 07:23)      I&O's Detail    30 Apr 2024 07:01  -  01 May 2024 07:00  --------------------------------------------------------  IN:    Oral Fluid: 480 mL  Total IN: 480 mL    OUT:    Blood Loss (mL): 0 mL    Voided (mL): 1950 mL  Total OUT: 1950 mL    Total NET: -1470 mL      01 May 2024 07:01  -  02 May 2024 00:49  --------------------------------------------------------  IN:    IV PiggyBack: 600 mL    Lactated Ringers: 265 mL    Oral Fluid: 300 mL    Sodium Chloride 0.9% Bolus: 500 mL  Total IN: 1665 mL    OUT:    Blood Loss (mL): 0 mL    Indwelling Catheter - Urethral (mL): 455 mL    Voided (mL): 0 mL  Total OUT: 455 mL    Total NET: 1210 mL            Physical Exam:  General: NAD, resting comfortably in bed  Respiratory: Nonlabored respirations  Cardio: pulse present  Abdomen: soft distended, appropriately tender, surgical incisions are c/d/i. ostomy with bowel sweat  Vascular: extremities are warm and well perfused.       Labs:    05-02    136  |  102  |  17  ----------------------------<  133<H>  4.4   |  21<L>  |  0.87    Ca    8.5      02 May 2024 00:09  Phos  4.7     05-02  Mg     2.1     05-02    TPro  7.1  /  Alb  3.8  /  TBili  0.4  /  DBili  x   /  AST  253<H>  /  ALT  127<H>  /  AlkPhos  70  05-01    LIVER FUNCTIONS - ( 01 May 2024 15:05 )  Alb: 3.8 g/dL / Pro: 7.1 g/dL / ALK PHOS: 70 U/L / ALT: 127 U/L / AST: 253 U/L / GGT: x                                 9.5    11.51 )-----------( 277      ( 02 May 2024 00:09 )             29.8     PT/INR - ( 01 May 2024 01:30 )   PT: 11.0 sec;   INR: 1.05 ratio         PTT - ( 01 May 2024 01:30 )  PTT:27.8 sec     Surgery Progress Note     24hour Events: 500cc bolus given in PACU due to elevated lactate. Repeat lactate was 3.2 from 2.8. 1L additional bolus given, and lactate improved to 1.8    Subjective:  Patient seen and examined on AM rounds. Patient OOB to chair, feeling well without complaints. Tolerating CLD.    Vital Signs:  Vital Signs Last 24 Hrs  T(C): 36.7 (02 May 2024 00:26), Max: 36.8 (01 May 2024 05:20)  T(F): 98 (02 May 2024 00:26), Max: 98.3 (01 May 2024 21:29)  HR: 61 (02 May 2024 00:26) (54 - 77)  BP: 121/51 (02 May 2024 00:26) (107/62 - 141/69)  BP(mean): 99 (01 May 2024 19:35) (79 - 99)  RR: 18 (02 May 2024 00:26) (12 - 20)  SpO2: 96% (02 May 2024 00:26) (95% - 100%)    Parameters below as of 02 May 2024 00:26  Patient On (Oxygen Delivery Method): room air  O2 Flow (L/min): 2      CAPILLARY BLOOD GLUCOSE  POCT Blood Glucose.: 150 mg/dL (01 May 2024 21:07)  POCT Blood Glucose.: 206 mg/dL (01 May 2024 15:00)  POCT Blood Glucose.: 133 mg/dL (01 May 2024 07:23)      I&O's Detail    30 Apr 2024 07:01  -  01 May 2024 07:00  --------------------------------------------------------  IN:    Oral Fluid: 480 mL  Total IN: 480 mL    OUT:    Blood Loss (mL): 0 mL    Voided (mL): 1950 mL  Total OUT: 1950 mL    Total NET: -1470 mL      01 May 2024 07:01  -  02 May 2024 00:49  --------------------------------------------------------  IN:    IV PiggyBack: 600 mL    Lactated Ringers: 265 mL    Oral Fluid: 300 mL    Sodium Chloride 0.9% Bolus: 500 mL  Total IN: 1665 mL    OUT:    Blood Loss (mL): 0 mL    Indwelling Catheter - Urethral (mL): 455 mL    Voided (mL): 0 mL  Total OUT: 455 mL  Total NET: 1210 mL      Physical Exam:  General: NAD, resting comfortably in bed  Respiratory: Nonlabored respirations  Abdomen: soft distended, appropriately tender, surgical incisions are c/d/i. Ostomy in place with bowel sweat  Ext: warm and well perfused.   Neuro: Responds appropriately       Labs:    05-02    136  |  102  |  17  ----------------------------<  133<H>  4.4   |  21<L>  |  0.87    Ca    8.5      02 May 2024 00:09  Phos  4.7     05-02  Mg     2.1     05-02    TPro  7.1  /  Alb  3.8  /  TBili  0.4  /  DBili  x   /  AST  253<H>  /  ALT  127<H>  /  AlkPhos  70  05-01    LIVER FUNCTIONS - ( 01 May 2024 15:05 )  Alb: 3.8 g/dL / Pro: 7.1 g/dL / ALK PHOS: 70 U/L / ALT: 127 U/L / AST: 253 U/L / GGT: x                                 9.5    11.51 )-----------( 277      ( 02 May 2024 00:09 )             29.8     PT/INR - ( 01 May 2024 01:30 )   PT: 11.0 sec;   INR: 1.05 ratio    PTT - ( 01 May 2024 01:30 )  PTT:27.8 sec

## 2024-05-03 ENCOUNTER — TRANSCRIPTION ENCOUNTER (OUTPATIENT)
Age: 79
End: 2024-05-03

## 2024-05-03 LAB
ANION GAP SERPL CALC-SCNC: 9 MMOL/L — SIGNIFICANT CHANGE UP (ref 5–17)
BASOPHILS # BLD AUTO: 0.01 K/UL — SIGNIFICANT CHANGE UP (ref 0–0.2)
BASOPHILS NFR BLD AUTO: 0.1 % — SIGNIFICANT CHANGE UP (ref 0–2)
BUN SERPL-MCNC: 14 MG/DL — SIGNIFICANT CHANGE UP (ref 7–23)
CALCIUM SERPL-MCNC: 8.5 MG/DL — SIGNIFICANT CHANGE UP (ref 8.4–10.5)
CHLORIDE SERPL-SCNC: 105 MMOL/L — SIGNIFICANT CHANGE UP (ref 96–108)
CO2 SERPL-SCNC: 24 MMOL/L — SIGNIFICANT CHANGE UP (ref 22–31)
CREAT SERPL-MCNC: 0.99 MG/DL — SIGNIFICANT CHANGE UP (ref 0.5–1.3)
EGFR: 77 ML/MIN/1.73M2 — SIGNIFICANT CHANGE UP
EOSINOPHIL # BLD AUTO: 0.08 K/UL — SIGNIFICANT CHANGE UP (ref 0–0.5)
EOSINOPHIL NFR BLD AUTO: 1 % — SIGNIFICANT CHANGE UP (ref 0–6)
GLUCOSE BLDC GLUCOMTR-MCNC: 107 MG/DL — HIGH (ref 70–99)
GLUCOSE BLDC GLUCOMTR-MCNC: 115 MG/DL — HIGH (ref 70–99)
GLUCOSE BLDC GLUCOMTR-MCNC: 147 MG/DL — HIGH (ref 70–99)
GLUCOSE BLDC GLUCOMTR-MCNC: 155 MG/DL — HIGH (ref 70–99)
GLUCOSE SERPL-MCNC: 100 MG/DL — HIGH (ref 70–99)
HCT VFR BLD CALC: 27.1 % — LOW (ref 39–50)
HGB BLD-MCNC: 8.7 G/DL — LOW (ref 13–17)
IMM GRANULOCYTES NFR BLD AUTO: 0.5 % — SIGNIFICANT CHANGE UP (ref 0–0.9)
LYMPHOCYTES # BLD AUTO: 1.59 K/UL — SIGNIFICANT CHANGE UP (ref 1–3.3)
LYMPHOCYTES # BLD AUTO: 20.7 % — SIGNIFICANT CHANGE UP (ref 13–44)
MAGNESIUM SERPL-MCNC: 2.2 MG/DL — SIGNIFICANT CHANGE UP (ref 1.6–2.6)
MCHC RBC-ENTMCNC: 27.4 PG — SIGNIFICANT CHANGE UP (ref 27–34)
MCHC RBC-ENTMCNC: 32.1 GM/DL — SIGNIFICANT CHANGE UP (ref 32–36)
MCV RBC AUTO: 85.2 FL — SIGNIFICANT CHANGE UP (ref 80–100)
MONOCYTES # BLD AUTO: 0.83 K/UL — SIGNIFICANT CHANGE UP (ref 0–0.9)
MONOCYTES NFR BLD AUTO: 10.8 % — SIGNIFICANT CHANGE UP (ref 2–14)
NEUTROPHILS # BLD AUTO: 5.14 K/UL — SIGNIFICANT CHANGE UP (ref 1.8–7.4)
NEUTROPHILS NFR BLD AUTO: 66.9 % — SIGNIFICANT CHANGE UP (ref 43–77)
NRBC # BLD: 0 /100 WBCS — SIGNIFICANT CHANGE UP (ref 0–0)
PHOSPHATE SERPL-MCNC: 3.1 MG/DL — SIGNIFICANT CHANGE UP (ref 2.5–4.5)
PLATELET # BLD AUTO: 247 K/UL — SIGNIFICANT CHANGE UP (ref 150–400)
POTASSIUM SERPL-MCNC: 4 MMOL/L — SIGNIFICANT CHANGE UP (ref 3.5–5.3)
POTASSIUM SERPL-SCNC: 4 MMOL/L — SIGNIFICANT CHANGE UP (ref 3.5–5.3)
RBC # BLD: 3.18 M/UL — LOW (ref 4.2–5.8)
RBC # FLD: 13.8 % — SIGNIFICANT CHANGE UP (ref 10.3–14.5)
SODIUM SERPL-SCNC: 138 MMOL/L — SIGNIFICANT CHANGE UP (ref 135–145)
WBC # BLD: 7.69 K/UL — SIGNIFICANT CHANGE UP (ref 3.8–10.5)
WBC # FLD AUTO: 7.69 K/UL — SIGNIFICANT CHANGE UP (ref 3.8–10.5)

## 2024-05-03 RX ORDER — BUDESONIDE AND FORMOTEROL FUMARATE DIHYDRATE 160; 4.5 UG/1; UG/1
2 AEROSOL RESPIRATORY (INHALATION)
Refills: 0 | Status: DISCONTINUED | OUTPATIENT
Start: 2024-05-03 | End: 2024-05-06

## 2024-05-03 RX ADMIN — Medication 15 MILLIGRAM(S): at 22:05

## 2024-05-03 RX ADMIN — Medication 1000 MILLIGRAM(S): at 13:00

## 2024-05-03 RX ADMIN — Medication 1000 MILLIGRAM(S): at 05:28

## 2024-05-03 RX ADMIN — Medication 15 MILLIGRAM(S): at 14:06

## 2024-05-03 RX ADMIN — DORZOLAMIDE HYDROCHLORIDE TIMOLOL MALEATE 1 DROP(S): 20; 5 SOLUTION/ DROPS OPHTHALMIC at 05:28

## 2024-05-03 RX ADMIN — Medication 15 MILLIGRAM(S): at 13:36

## 2024-05-03 RX ADMIN — Medication 15 MILLIGRAM(S): at 04:02

## 2024-05-03 RX ADMIN — Medication 1000 MILLIGRAM(S): at 06:13

## 2024-05-03 RX ADMIN — BUDESONIDE AND FORMOTEROL FUMARATE DIHYDRATE 2 PUFF(S): 160; 4.5 AEROSOL RESPIRATORY (INHALATION) at 18:21

## 2024-05-03 RX ADMIN — Medication 15 MILLIGRAM(S): at 04:19

## 2024-05-03 RX ADMIN — Medication 1: at 13:35

## 2024-05-03 RX ADMIN — Medication 1000 MILLIGRAM(S): at 18:18

## 2024-05-03 RX ADMIN — HEPARIN SODIUM 5000 UNIT(S): 5000 INJECTION INTRAVENOUS; SUBCUTANEOUS at 05:28

## 2024-05-03 RX ADMIN — HEPARIN SODIUM 5000 UNIT(S): 5000 INJECTION INTRAVENOUS; SUBCUTANEOUS at 13:36

## 2024-05-03 RX ADMIN — Medication 15 MILLIGRAM(S): at 22:35

## 2024-05-03 RX ADMIN — Medication 1000 MILLIGRAM(S): at 01:06

## 2024-05-03 RX ADMIN — Medication 1000 MILLIGRAM(S): at 00:06

## 2024-05-03 RX ADMIN — HEPARIN SODIUM 5000 UNIT(S): 5000 INJECTION INTRAVENOUS; SUBCUTANEOUS at 22:05

## 2024-05-03 RX ADMIN — LATANOPROST 1 DROP(S): 0.05 SOLUTION/ DROPS OPHTHALMIC; TOPICAL at 12:30

## 2024-05-03 RX ADMIN — Medication 1000 MILLIGRAM(S): at 12:30

## 2024-05-03 RX ADMIN — Medication 1000 MILLIGRAM(S): at 18:42

## 2024-05-03 RX ADMIN — DORZOLAMIDE HYDROCHLORIDE TIMOLOL MALEATE 1 DROP(S): 20; 5 SOLUTION/ DROPS OPHTHALMIC at 18:19

## 2024-05-03 NOTE — DISCHARGE NOTE PROVIDER - NSDCCPCAREPLAN_GEN_ALL_CORE_FT
PRINCIPAL DISCHARGE DIAGNOSIS  Diagnosis: BRBPR (bright red blood per rectum)  Assessment and Plan of Treatment: WOUND CARE: Remove outer dressing prior to shower.  You may shower. Do not scrub incision. Pat Dry abdomen. Leave the white steri strips in place, they will fall off on their own in approximately 5-7 days. Cover incisions with dry gauze and paper tape, change daily or as needed.   OSTOMY: As ostomy RN educated concerning care.  BATHING: You may shower and/or sponge bathe 24 hours after surgery. Do not submerge the incision underwater for the next 2 weeks.  ACTIVITY: No heavy lifting anything more than 10-15lbs or straining. Otherwise, you may return to your usual level of physical activity. If you are taking narcotic pain medication (such as Oxycodone) do NOT drive a car, operate machinery or make important decisions.  DIET: Maintain Low Fiber Diet until your next appointment.  PAIN: A prescription for oxycodone has been sent to the pharmacy. You should only take these for severe pain. For mild or moderate pain, you may take 975mg of tylenol every 6 hours. Do not exceed more than 4G tylenol per day.   NOTIFY YOUR SURGEON IF: You have any bleeding that does not stop, any pus draining from your wound, any fever (over 100.4 F) or chills, persistent nausea/vomiting with inability to tolerate food or liquids, persistent diarrhea, or if your pain is not controlled on your discharge pain medications.  FOLLOW-UP:  1. Please call to make a follow-up appointment within one to two weeks of discharge with Dr. Styles  2. Please follow up with your primary care physician in one week regarding your hospitalization.       PRINCIPAL DISCHARGE DIAGNOSIS  Diagnosis: BRBPR (bright red blood per rectum)  Assessment and Plan of Treatment: WOUND CARE: Remove outer dressing prior to shower.  You may shower. Do not scrub or soak incision. Pat dry gently. Leave the white steri strips in place, they will fall off on their own. Cover incisions with dry gauze and paper tape for comfort, change daily or as needed. Staples will be removed at your follow up visit.  OSTOMY: As ostomy RN educated concerning care.  BATHING: You may shower and/or sponge bathe 24 hours after surgery. Do not submerge the incision underwater for the next 2 weeks. No swimming pools, hot tubs or tub baths.  ACTIVITY: No heavy lifting anything more than 10-15lbs or straining. Otherwise, you may return to your usual level of physical activity. If you are taking narcotic pain medication (such as Oxycodone) do NOT drive a car, operate machinery or make important decisions.  DIET: Maintain Low Fiber Diet until your next appointment.  PAIN: A prescription for oxycodone has been sent to the pharmacy. You should only take these for severe pain. For mild or moderate pain, you may take 975mg of Tylenol every 6 hours and/or Ibuprofen 400mg twice daily. Do not exceed more than 4G tylenol per day.   NOTIFY YOUR SURGEON IF: You have any bleeding that does not stop, any pus draining from your wound, any fever (over 100.4 F) or chills, persistent nausea/vomiting with inability to tolerate food or liquids, persistent diarrhea, or if your pain is not controlled on your discharge pain medications.  FOLLOW-UP:  1. Please call to make a follow-up appointment within one to two weeks of discharge with Dr. Styles.  2. Please follow up with your primary care physician in one week regarding your hospitalization.

## 2024-05-03 NOTE — DISCHARGE NOTE PROVIDER - CARE PROVIDER_API CALL
Follow up patient note  Interventional spine and Pain  Physiatry (physical medicine and  Rehabilitation)       Chief complaint:   Chief Complaint   Patient presents with   • Back Pain          HISTORY    Please see new patient note by Dr Ayala,  for more details.     HPI  Patient identification: Barron Hewitt ,  1958,   With Diagnoses of Lumbar radiculopathy, Lumbar spondylosis, Chronic left-sided low back pain with left-sided sciatica, Left lumbar radiculopathy, Weakness, and Atrophy of muscle of left thigh were pertinent to this visit.     procedures   2/15/2022 left Lumbar Transforaminal Epidural Steroid  at the L3-4 and L4-5 levels.       He goes on walks for about a mile per day which is a big improvement for him. Prior to the injury he was walking about 06985 steps per day.     Now he is having intermittent pain which is happening several times per day when the pain is present is 8 of 10 intensity.  Currently the pain is 3 out of 10 intensity.  He notes a significant improvement after the epidural steroid injection but now his pain is intermittent previous of this was constant.  Previously had a constant 10 of 10 pain.    Medications tried include zanaflex, nsaids, tramadol, norco     ROS Red Flags :   Fever, Chills, Sweats: Denies  Involuntary Weight Loss: Denies  Bowel/Bladder Incontinence: Denies  Saddle Anesthesia: Denies        PMHx:   Past Medical History:   Diagnosis Date   • Abnormal nuclear cardiac imaging test 2014   • Allergy    • Anesthesia     PONV   • ASTHMA    • CHEST PAIN 6/15/2011   • Chest pain at rest 2014   • Coronary-myocardial bridge 2012   • Diabetes 2009    insulin and oral meds   • Hyperlipidemia    • Hypertension    • Mild intermittent asthma without complication 2017   • Myocardial bridge     cardiologist, Dr. Valverde   • Sleep apnea     has CPAP   • Snoring        PSHx:   Past Surgical History:   Procedure Laterality Date   • MI NJX  AA&/STRD TFRML EPI LUMBAR/SACRAL 1 LEVEL Left 2/15/2022    Procedure: LEFT L3-4 and L4-5 transforaminal epidural steroid injection with fluoroscopic guidance;  Surgeon: Dragan Ayala M.D.;  [facility];  Service: Pain Management   • SHOULDER DECOMPRESSION ARTHROSCOPIC Left 1/4/2018    Procedure: SHOULDER DECOMPRESSION ARTHROSCOPIC - SUBACROMIAL;  Surgeon: Linwood Grover M.D.;  Location: [facility];  Service: Orthopedics   • SHOULDER ARTHROSCOPY W/ BICIPITAL TENODESIS REPAIR Left 1/4/2018    Procedure: SHOULDER ARTHROSCOPY W/ BICIPITAL Tenotomy REPAIR;  Surgeon: Linwood Grover M.D.;  Location: [facility];  Service: Orthopedics   • CLAVICLE DISTAL EXCISION Left 1/4/2018    Procedure: CLAVICLE DISTAL EXCISION - POSSIBLE;  Surgeon: Linwood Grover M.D.;  Location: [facility];  Service: Orthopedics   • RECOVERY  4/8/2016    Procedure: CATH LAB [facility] WITH POSSIBLE NAVIN;  Surgeon: [facility];  [place] [facility] [facility]  Service:    • VENTRAL HERNIA REPAIR LAPAROSCOPIC  11/1/2012    Performed by Marcos Ramírez M.D. at [facility]   • KNEE ARTHROSCOPY  1990's    right   • SHOULDER ARTHROSCOPY  1990's    right   • SINUSOTOMIES  1990's    times two surgeries   • TUMOR EXCISION WITH BIOPSY  1990's    right hand - thumb       Family history   Family History   Problem Relation Age of Onset   • Breast Cancer Mother    • Hypertension Mother    • Cancer Mother         breast   • Heart Disease Mother         hx of bypass   • Hypertension Father    • Arthritis Father    • Diabetes Father         prediabetes   • No Known Problems Sister    • Arthritis Brother    • Heart Disease Other    • Hypertension Other    • Cancer Other    • No Known Problems Brother          Medications:   Outpatient Medications Marked as Taking for the 3/1/22 encounter (Office Visit) with Dragan Ayala M.D.   Medication Sig  Kathya Styles  Colon/Rectal Surgery  [address]  [place] [address]  Phone: [phone]  Fax: [phone]  Follow Up Time: 2 weeks   Dispense Refill   • metFORMIN (GLUCOPHAGE) 500 MG Tab Take 500 mg by mouth 2 times a day with meals.     • benazepril (LOTENSIN) 20 MG Tab TAKE 1 TABLET DAILY 90 Tablet 3   • albuterol (VENTOLIN HFA) 108 (90 Base) MCG/ACT Aero Soln inhalation aerosol Inhale 2 Puffs every four hours as needed. FOR SHORTNESS OF BREATH 18 g 5   • fluticasone-salmeterol (ADVAIR DISKUS) 500-50 MCG/DOSE AEROSOL POWDER, BREATH ACTIVATED Inhale 1 Puff every 12 hours. 3 Each 3   • finasteride (PROSCAR) 5 MG Tab Take 1 Tablet by mouth every day. 90 Tablet 3   • levothyroxine (SYNTHROID) 75 MCG Tab Take 1 Tablet by mouth every morning on an empty stomach. Taking only one d per week.  50 mcg all other days. 12 Tablet 3   • levothyroxine (SYNTHROID) 50 MCG Tab Take one pill 6 days per week on empty stomach.  75 mcg on 7th day. 90 Tablet 3   • atorvastatin (LIPITOR) 80 MG tablet Take 1 Tablet by mouth every evening. 90 Tablet 3   • tizanidine (ZANAFLEX) 4 MG Tab Take 1 Tablet by mouth every 6 hours as needed (muscle spasms). 30 Tablet 2   • EPINEPHrine (EPIPEN) 0.3 MG/0.3ML Solution Auto-injector solution for injection Inject 0.3 mL into the thigh one time for 1 dose. 1 Each 0   • Continuous Blood Gluc Sensor (FREESTYLE LUNA 14 DAY SENSOR) Misc 1 Application every 14 days. 6 Each 3   • Dulaglutide (TRULICITY) 3 MG/0.5ML Solution Pen-injector Inject 1 Dose under the skin every 7 days. 2 mL 11   • montelukast (SINGULAIR) 10 MG Tab Take 1 tablet by mouth every evening. 90 tablet 3   • Canagliflozin (INVOKANA) 300 MG Tab Take 1 tablet by mouth every day. 90 tablet 3   • DILTIAZem CD (CARTIA XT) 180 MG CAPSULE SR 24 HR Take 1 capsule by mouth every day. 90 capsule 3   • tamsulosin (FLOMAX) 0.4 MG capsule Take 1 capsule by mouth 1/2 hour after breakfast. 30 capsule 4   • NON SPECIFIED CPAP supplies through [facility] 1 Each 1   • ALPHA LIPOIC ACID PO Take 600 mg by mouth 2 Times a Day.     • Omega-3 Fatty Acids (FISH OIL) 1200 MG CAPS Take   by mouth.     • Cinnamon 500 MG CAPS Take 1,000 mg by mouth.     • aspirin EC (ECOTRIN) 81 MG TBEC Take 81 mg by mouth every day.       • pantoprazole (PROTONIX) 40 MG TBEC Take 40 mg by mouth every day.       • Coenzyme Q10 200 MG Cap Take  by mouth every day.       • Cholecalciferol (VITAMIN D) 2000 UNIT TABS Take  by mouth 2 times a day.     • cetirizine (ZYRTEC) 10 MG Tab Take 10 mg by mouth every day.          Current Outpatient Medications on File Prior to Visit   Medication Sig Dispense Refill   • metFORMIN (GLUCOPHAGE) 500 MG Tab Take 500 mg by mouth 2 times a day with meals.     • benazepril (LOTENSIN) 20 MG Tab TAKE 1 TABLET DAILY 90 Tablet 3   • albuterol (VENTOLIN HFA) 108 (90 Base) MCG/ACT Aero Soln inhalation aerosol Inhale 2 Puffs every four hours as needed. FOR SHORTNESS OF BREATH 18 g 5   • fluticasone-salmeterol (ADVAIR DISKUS) 500-50 MCG/DOSE AEROSOL POWDER, BREATH ACTIVATED Inhale 1 Puff every 12 hours. 3 Each 3   • finasteride (PROSCAR) 5 MG Tab Take 1 Tablet by mouth every day. 90 Tablet 3   • levothyroxine (SYNTHROID) 75 MCG Tab Take 1 Tablet by mouth every morning on an empty stomach. Taking only one d per week.  50 mcg all other days. 12 Tablet 3   • levothyroxine (SYNTHROID) 50 MCG Tab Take one pill 6 days per week on empty stomach.  75 mcg on 7th day. 90 Tablet 3   • atorvastatin (LIPITOR) 80 MG tablet Take 1 Tablet by mouth every evening. 90 Tablet 3   • tizanidine (ZANAFLEX) 4 MG Tab Take 1 Tablet by mouth every 6 hours as needed (muscle spasms). 30 Tablet 2   • EPINEPHrine (EPIPEN) 0.3 MG/0.3ML Solution Auto-injector solution for injection Inject 0.3 mL into the thigh one time for 1 dose. 1 Each 0   • Continuous Blood Gluc Sensor (FREESTYLE LUNA 14 DAY SENSOR) Misc 1 Application every 14 days. 6 Each 3   • Dulaglutide (TRULICITY) 3 MG/0.5ML Solution Pen-injector Inject 1 Dose under the skin every 7 days. 2 mL 11   • montelukast (SINGULAIR) 10 MG Tab Take 1 tablet by mouth every  evening. 90 tablet 3   • Canagliflozin (INVOKANA) 300 MG Tab Take 1 tablet by mouth every day. 90 tablet 3   • DILTIAZem CD (CARTIA XT) 180 MG CAPSULE SR 24 HR Take 1 capsule by mouth every day. 90 capsule 3   • tamsulosin (FLOMAX) 0.4 MG capsule Take 1 capsule by mouth 1/2 hour after breakfast. 30 capsule 4   • NON SPECIFIED CPAP supplies through [facility] 1 Each 1   • ALPHA LIPOIC ACID PO Take 600 mg by mouth 2 Times a Day.     • Omega-3 Fatty Acids (FISH OIL) 1200 MG CAPS Take  by mouth.     • Cinnamon 500 MG CAPS Take 1,000 mg by mouth.     • aspirin EC (ECOTRIN) 81 MG TBEC Take 81 mg by mouth every day.       • pantoprazole (PROTONIX) 40 MG TBEC Take 40 mg by mouth every day.       • Coenzyme Q10 200 MG Cap Take  by mouth every day.       • Cholecalciferol (VITAMIN D) 2000 UNIT TABS Take  by mouth 2 times a day.     • cetirizine (ZYRTEC) 10 MG Tab Take 10 mg by mouth every day.     • albuterol 108 (90 Base) MCG/ACT Aero Soln inhalation aerosol Inhale 2 Puffs every four hours as needed for Shortness of Breath. Pt prefers ventolin if possible. Thank you 3 Each 3   • albuterol 108 (90 Base) MCG/ACT Aero Soln inhalation aerosol Inhale 2 Puffs every four hours as needed for Shortness of Breath. 1 Each 4     No current facility-administered medications on file prior to visit.         Allergies:   Allergies   Allergen Reactions   • Kiwi Extract Anaphylaxis   • Shellfish Allergy Anaphylaxis   • Strawberry Anaphylaxis   • Ozempic (0.25 Or 0.5 Mg-Dose) [Semaglutide] Unspecified     Pt does not recall any reaction   • Sulfa Drugs Rash and Unspecified   • Testosterone Rash       Social Hx:   Social History     Socioeconomic History   • Marital status:      Spouse name: Not on file   • Number of children: Not on file   • Years of education: Not on file   • Highest education level: Not on file   Occupational History   • Not on file   Tobacco Use   • Smoking status: Former Smoker     Packs/day: 0.00      "Types: Cigars   • Smokeless tobacco: Never Used   • Tobacco comment: occasional cigars   Vaping Use   • Vaping Use: Never used   Substance and Sexual Activity   • Alcohol use: Yes     Comment: rare - 1-2 per month (social)   • Drug use: Yes     Types: Marijuana     Comment: occ cigar smoking   • Sexual activity: Not on file     Comment: ; 2 biological kids (2 of his wife's); 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   Other Topics Concern   • Not on file   Social History Narrative   • Not on file     Social Determinants of Health     Financial Resource Strain: Not on file   Food Insecurity: Not on file   Transportation Needs: Not on file   Physical Activity: Not on file   Stress: Not on file   Social Connections: Not on file   Intimate Partner Violence: Not on file   Housing Stability: Not on file         EXAMINATION     Physical Exam:   Vitals: /82 (BP Location: Right arm, Patient Position: Sitting, BP Cuff Size: Adult)   Pulse 82   Temp 36.5 °C (97.7 °F) (Temporal)   Ht 1.753 m (5' 9\")   Wt 76.2 kg (167 lb 15.9 oz)   SpO2 95%     Constitutional:   Body Habitus: Body mass index is 24.81 kg/m².  Cooperation: Fully cooperates with exam  Appearance: Well-groomed no disheveled    Respiratory-  breathing comfortable on room air, no audible wheezing  Cardiovascular- capillary refills less than 2 seconds. No lower extremity edema is noted.   Psychiatric- alert and oriented ×3. Normal affect.    MSK and Neuro: -      Thoracic/Lumbar Spine/Sacral Spine/Hips   decreased active range of motion with flexion, lateral flexion, and rotation bilaterally.   There is decreased active range of motion with lumbar extension.      Palpation:   No tenderness to palpation in midline at T1-T12 levels. No tenderness to palpation in the left and right of the midline T1-L5, NEGATIVE for tenderness to palpation to the para-midline region in the lower lumbar levels.  palpation over SI joint: negative bilaterally  "   palpation in hip or over the gluteus medius tendon insertion: negative bilaterally      Lumbar spine Special tests  Neuro tension  Straight leg test negative right, positive left    Slump test negative right, positive left      Key points for the international standards for neurological classification of spinal cord injury (ISNCSCI) to light touch.     Dermatome R L                                      L2 2 2   L3 2 2   L4 2 2   L5 2 2   S1 2 2   S2 2 2         Motor Exam Lower Extremities    ? Myotome R L   Hip flexion L2 5 5   Knee extension L3 5 4+   Ankle dorsiflexion L4 5 4+   Toe extension L5 5 5   Ankle plantarflexion S1 5 5                 MEDICAL DECISION MAKING    DATA    Labs:   Lab Results   Component Value Date/Time    SODIUM 135 06/09/2021 01:13 PM    POTASSIUM 3.6 06/09/2021 01:13 PM    CHLORIDE 100 06/09/2021 01:13 PM    CO2 24 06/09/2021 01:13 PM    GLUCOSE 113 (H) 06/09/2021 01:13 PM    BUN 12 06/09/2021 01:13 PM    CREATININE 0.74 06/09/2021 01:13 PM    CREATININE 1.1 07/10/2008 09:25 AM        Lab Results   Component Value Date/Time    PROTHROMBTM 13.3 12/22/2017 09:26 AM    INR 1.04 12/22/2017 09:26 AM        Lab Results   Component Value Date/Time    WBC 9.7 06/04/2020 02:18 PM    RBC 6.24 (H) 06/04/2020 02:18 PM    HEMOGLOBIN 18.5 (H) 06/04/2020 02:18 PM    HEMATOCRIT 54.6 (H) 06/04/2020 02:18 PM    MCV 87.5 06/04/2020 02:18 PM    MCH 29.6 06/04/2020 02:18 PM    MCHC 33.9 06/04/2020 02:18 PM    MPV 10.7 06/04/2020 02:18 PM    NEUTSPOLYS 67.60 06/04/2020 02:18 PM    LYMPHOCYTES 17.60 (L) 06/04/2020 02:18 PM    MONOCYTES 9.80 06/04/2020 02:18 PM    EOSINOPHILS 3.50 06/04/2020 02:18 PM    BASOPHILS 1.20 06/04/2020 02:18 PM    HYPOCHROMIA 1+ 08/27/2013 07:13 AM        Lab Results   Component Value Date/Time    HBA1C 7.2 (A) 11/16/2021 02:36 PM          Imaging:   I personally reviewed following images    MRI lumbar spine 7/27/2021 with moderate left neuroforaminal stenosis at L3-4 and L4-5 with  mild right neuroforaminal stenosis at L3-4 and L4-5.  Mild to moderate bilateral neuroforaminal stenosis at L5-S1.  There is facet arthropathy bilaterally at L3-4, L4-5, L5-S1        I reviewed the following radiology reports                         Results for orders placed during the hospital encounter of 07/27/21    MR-LUMBAR SPINE-W/O    Impression  1.  L4-5 annular disc bulge with a central disc protrusion and bilateral facet degeneration. There is borderline central canal narrowing. There is moderate mild right neural foraminal narrowing.    2.  L3-4 annular disc bulge with a left lateral disc protrusion. There is moderate to severe left and mild right neural foraminal narrowing again seen.    3.  L5-S1 degenerative disc disease and facet degeneration results in moderate bilateral neuroforaminal narrowing.        Results for orders placed during the hospital encounter of 07/27/21    MR-LUMBAR SPINE-W/O    Impression  1.  L4-5 annular disc bulge with a central disc protrusion and bilateral facet degeneration. There is borderline central canal narrowing. There is moderate mild right neural foraminal narrowing.    2.  L3-4 annular disc bulge with a left lateral disc protrusion. There is moderate to severe left and mild right neural foraminal narrowing again seen.    3.  L5-S1 degenerative disc disease and facet degeneration results in moderate bilateral neuroforaminal narrowing.      Results for orders placed during the hospital encounter of 07/27/21    MR-MRA NECK-W/O    Impression  There is no significant stenosis in the visualized extracranial neck arteries.                                                                   Results for orders placed during the hospital encounter of 05/01/17    CT-ABDOMEN-PELVIS WITH    Impression  1.  Findings suggestive of early or low grade obstruction in the mid to distal small bowel. Enteritis with associated ileus could also give a similar appearance.  2.  LEFT calyceal  stone                       Results for orders placed during the hospital encounter of 17    DX-CHEST-2 VIEWS    Impression  Negative two views of the chest.                          Electrodiagnostics   2022 EMG  IMPRESSION:  This is an abnormal electrodiagnostic study due to evidence of chronic reinnervation changes in the left vastus lateralis/medialis with mild active denervation changes noted.  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       There is no strong electrodiagnostic evidence of a an active right lumbosacral plexopathy though given some responses are unobtainable a mild lumbar plexopathy/femoral neuropathy may be present.  There is no EMG evidence of a right lumbosacral radiculopathy.                                         DIAGNOSIS   Visit Diagnoses     ICD-10-CM   1. Lumbar radiculopathy  M54.16   2. Lumbar spondylosis  M47.816   3. Chronic left-sided low back pain with left-sided sciatica  M54.42    G89.29   4. Left lumbar radiculopathy  M54.16   5. Weakness  R53.1   6. Atrophy of muscle of left thigh  M62.552         ASSESSMENT and PLAN:     Barron Hewitt  1958 male      Barron was seen today for back pain.    Diagnoses and all orders for this visit:    Lumbar radiculopathy  -     Referral to Physical Medicine Rehab    Lumbar spondylosis    Chronic left-sided low back pain with left-sided sciatica    Left lumbar radiculopathy    Weakness    Atrophy of muscle of left thigh        I reviewed the fluoroscopic images showing successful left L3-4 and L4-5 transforaminal epidural steroid injection.     The patient had significant improvement in his baseline pain he still has intermittent pain.  Strength is been improving.  His exercises been improving.  These are still not back to baseline.  He still  has moderate to severe pain which can happen throughout the day but these are now happening intermittently.  Overall the patient is very happy with the results of the epidural steroid injection.  The strength is also improving but he still does have weakness.  We discussed emergency precautions.    We discussed an additional epidural steroid injection the patient is amenable to this.    I have ordered a left L3-4 and L4-5 transforaminal epidural steroid injection    The risks benefits and alternatives to this procedure were discussed and the patient wishes to proceed with the procedure. Risks include but are not limited to damage to surrounding structures, infection, bleeding, worsening of pain which can be permanent, weakness which can be permanent. Benefits include pain relief, improved function. Alternatives includes not doing the procedure.      Continue Zanaflex as needed    Follow up: After the above procedure    Thank you for allowing me to participate in the care of this patient. If you have any questions please not hesitate to contact me.             Please note that this dictation was created using voice recognition software. I have made every reasonable attempt to correct obvious errors but there may be errors of grammar and content that I may have overlooked prior to finalization of this note.      Dragan Ayala MD  Interventional Spine and Sports Physiatry  Physical Medicine and Rehabilitation  [facility]        Kathya Styles  Colon/Rectal Surgery  [address], [address]  [place] [address]  Phone: [phone]  Fax: [phone]  Follow Up Time: 1 week    Primary Care Physician,   Phone: (   )    -  Fax: (   )    -  Follow Up Time: 1 week

## 2024-05-03 NOTE — DISCHARGE NOTE PROVIDER - NSDCACTIVITY_GEN_ALL_CORE
Do not drive or operate machinery/No heavy lifting/straining/Follow Instructions Provided by your Surgical Team No heavy lifting/straining/Follow Instructions Provided by your Surgical Team

## 2024-05-03 NOTE — PROGRESS NOTE ADULT - ASSESSMENT
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 S/P Lap LAR, with DLI creation on 5/1     Plan:  - ERP  - Pain control PRN  - Diet: CLD, possible advancement to LRD  - Activity: as tolerated   - rader  - DVT ppx  - PT eval    Surgery Green Team  [phone]. 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 S/P Lap LAR, with DLI creation on 5/1     Plan:  - ERP  - Pain control PRN  - Diet: CLD, adv to LRD today  - Activity: as tolerated   - rader dc- pending TOV  - DVT ppx  - stoma education  - PT- NPT    Surgery Green Team  [phone].

## 2024-05-03 NOTE — DISCHARGE NOTE PROVIDER - NSDCCPTREATMENT_GEN_ALL_CORE_FT
PRINCIPAL PROCEDURE  Procedure: Laparoscopic low anterior resection  Findings and Treatment:       SECONDARY PROCEDURE  Procedure: Ileostomy  Findings and Treatment:

## 2024-05-03 NOTE — DISCHARGE NOTE PROVIDER - CARE PROVIDERS DIRECT ADDRESSES
,[email or web address],DirectAddress_Unknown

## 2024-05-03 NOTE — ADVANCED PRACTICE NURSE CONSULT - RECOMMEDATIONS
Will recommend:  1. Encourage self care -participation w/ emptying .  2. Empty pouch when 1/3-1/2 full   3. Change pouching system every 3-4 days & prn leakage  4. Reinforce ostomy teaching w/patient (to be independent w/emptying for Monday as d/c planning home)  5. Contact ostomy specialists if questions, concerns/issues .  6. Supplies: [place] 2 1/4" Ceraplus soft convex skin barrier (#14648), Junito 2 1/4" drainable pouch (#95523); Accessory products:  stoma paste #[id number], stoma powder (#9306) & Cavilon No sting barrier film wipe (#9471)  Will f/u on Monday. Support & encouragement provided throughout visit.

## 2024-05-03 NOTE — DISCHARGE NOTE PROVIDER - NSDCMRMEDTOKEN_GEN_ALL_CORE_FT
Covarem (perindopril and amlodipine): unknown dose or fq.  metformin: unknown dose   acetaminophen 500 mg oral tablet: 2 tab(s) orally every 6 hours  Covarem (perindopril and amlodipine): unknown dose or fq.  metformin: unknown dose

## 2024-05-03 NOTE — PROGRESS NOTE ADULT - SUBJECTIVE AND OBJECTIVE BOX
Surgery Progress Note     Subjective:  Patient seen and examined.       Vital Signs:  Vital Signs Last 24 Hrs  T(C): 37.3 (03 May 2024 00:56), Max: 37.3 (03 May 2024 00:56)  T(F): 99.2 (03 May 2024 00:56), Max: 99.2 (03 May 2024 00:56)  HR: 71 (03 May 2024 00:56) (57 - 87)  BP: 113/51 (03 May 2024 00:56) (113/51 - 144/66)  RR: 18 (03 May 2024 00:56) (18 - 19)  SpO2: 98% (03 May 2024 00:56) (94% - 99%)    Parameters below as of 03 May 2024 00:56  Patient On (Oxygen Delivery Method): nasal cannula  O2 Flow (L/min): 2      CAPILLARY BLOOD GLUCOSE      POCT Blood Glucose.: 117 mg/dL (02 May 2024 21:07)  POCT Blood Glucose.: 175 mg/dL (02 May 2024 17:02)  POCT Blood Glucose.: 106 mg/dL (02 May 2024 12:35)  POCT Blood Glucose.: 133 mg/dL (02 May 2024 07:34)      I&O's Detail    01 May 2024 07:01  -  02 May 2024 07:00  --------------------------------------------------------  IN:    IV PiggyBack: 600 mL    Lactated Ringers: 745 mL    Oral Fluid: 390 mL    Sodium Chloride 0.9% Bolus: 500 mL  Total IN: 2235 mL    OUT:    Blood Loss (mL): 0 mL    Indwelling Catheter - Urethral (mL): 655 mL    Voided (mL): 0 mL  Total OUT: 655 mL    Total NET: 1580 mL      02 May 2024 07:01  -  03 May 2024 01:19  --------------------------------------------------------  IN:    Lactated Ringers: 480 mL    Oral Fluid: 480 mL  Total IN: 960 mL    OUT:    Blood Loss (mL): 0 mL    Ileostomy (mL): 300 mL    Indwelling Catheter - Urethral (mL): 1600 mL    Voided (mL): 0 mL  Total OUT: 1900 mL    Total NET: -940 mL            Physical Exam:  General: NAD, resting comfortably in bed  Respiratory: Nonlabored respirations  Abdomen: soft distended, appropriately tender, surgical incisions are c/d/i. Ostomy in place  Ext: warm and well perfused.       Labs:    05-02    136  |  102  |  17  ----------------------------<  133<H>  4.4   |  21<L>  |  0.87    Ca    8.5      02 May 2024 00:09  Phos  4.7     05-02  Mg     2.1     05-02    TPro  6.5  /  Alb  3.6  /  TBili  0.4  /  DBili  <0.1  /  AST  207<H>  /  ALT  123<H>  /  AlkPhos  65  05-02    LIVER FUNCTIONS - ( 02 May 2024 00:09 )  Alb: 3.6 g/dL / Pro: 6.5 g/dL / ALK PHOS: 65 U/L / ALT: 123 U/L / AST: 207 U/L / GGT: x                                 9.5    11.51 )-----------( 277      ( 02 May 2024 00:09 )             29.8     PT/INR - ( 01 May 2024 01:30 )   PT: 11.0 sec;   INR: 1.05 ratio         PTT - ( 01 May 2024 01:30 )  PTT:27.8 sec     SURGERY DAILY PROGRESS NOTE:     Overnight Events:  No acute events overnight.    SUBJECTIVE: Patient seen and evaluated on AM rounds. Pt is resting comfortably in bed with no complaints. Denies fever, chills, N/V, chest pain, or shortness of breath. Tolerating clears diet. Ambulating well. Pain is adequately controlled on current regimen.      Vital Signs:  Vital Signs Last 24 Hrs  T(C): 37.3 (03 May 2024 00:56), Max: 37.3 (03 May 2024 00:56)  T(F): 99.2 (03 May 2024 00:56), Max: 99.2 (03 May 2024 00:56)  HR: 71 (03 May 2024 00:56) (57 - 87)  BP: 113/51 (03 May 2024 00:56) (113/51 - 144/66)  RR: 18 (03 May 2024 00:56) (18 - 19)  SpO2: 98% (03 May 2024 00:56) (94% - 99%)    Parameters below as of 03 May 2024 00:56  Patient On (Oxygen Delivery Method): nasal cannula  O2 Flow (L/min): 2      CAPILLARY BLOOD GLUCOSE      POCT Blood Glucose.: 117 mg/dL (02 May 2024 21:07)  POCT Blood Glucose.: 175 mg/dL (02 May 2024 17:02)  POCT Blood Glucose.: 106 mg/dL (02 May 2024 12:35)  POCT Blood Glucose.: 133 mg/dL (02 May 2024 07:34)      I&O's Detail    01 May 2024 07:01  -  02 May 2024 07:00  --------------------------------------------------------  IN:    IV PiggyBack: 600 mL    Lactated Ringers: 745 mL    Oral Fluid: 390 mL    Sodium Chloride 0.9% Bolus: 500 mL  Total IN: 2235 mL    OUT:    Blood Loss (mL): 0 mL    Indwelling Catheter - Urethral (mL): 655 mL    Voided (mL): 0 mL  Total OUT: 655 mL    Total NET: 1580 mL      02 May 2024 07:01  -  03 May 2024 01:19  --------------------------------------------------------  IN:    Lactated Ringers: 480 mL    Oral Fluid: 480 mL  Total IN: 960 mL    OUT:    Blood Loss (mL): 0 mL    Ileostomy (mL): 300 mL    Indwelling Catheter - Urethral (mL): 1600 mL    Voided (mL): 0 mL  Total OUT: 1900 mL    Total NET: -940 mL            Physical Exam:  General: NAD, resting comfortably in bed  Respiratory: Nonlabored respirations  Abdomen: soft distended, appropriately tender, surgical incisions are c/d/i. penrose in place.  Ostomy in place with liquid stool output, no gas  Groin: Clark in place with output  Ext: warm and well perfused.       Labs:    05-02    136  |  102  |  17  ----------------------------<  133<H>  4.4   |  21<L>  |  0.87    Ca    8.5      02 May 2024 00:09  Phos  4.7     05-02  Mg     2.1     05-02    TPro  6.5  /  Alb  3.6  /  TBili  0.4  /  DBili  <0.1  /  AST  207<H>  /  ALT  123<H>  /  AlkPhos  65  05-02    LIVER FUNCTIONS - ( 02 May 2024 00:09 )  Alb: 3.6 g/dL / Pro: 6.5 g/dL / ALK PHOS: 65 U/L / ALT: 123 U/L / AST: 207 U/L / GGT: x                                 9.5    11.51 )-----------( 277      ( 02 May 2024 00:09 )             29.8     PT/INR - ( 01 May 2024 01:30 )   PT: 11.0 sec;   INR: 1.05 ratio         PTT - ( 01 May 2024 01:30 )  PTT:27.8 sec

## 2024-05-03 NOTE — DISCHARGE NOTE PROVIDER - HOSPITAL COURSE
HPI: 79M PMHx HTN, T2DM, h/o prostate ca s/p RT. He's currently visiting from [place] and came to ED for BRBPR [id number] in the past few days. He reports vague abd discomfort.  Denies fever, chills, cp, sob, cough, palpitation, n/v/d. In ED, HR wnl, RR wnl, on RA. Intermittently hypertensive in the ED but spontaneously improving. CBC w/ hgb 10.6 with unknown baseline, plt wnl. Repeat CBC in 4hrs with hgb 10.7. Coag wnl. CMP unremarkable except for AST 50 and ALT 34. VBG w/ lactate 1.2. UA unremarkable. CXR clear. CT a/p concerning for malignancy in distal sigmoid colon. ED emailed GI. Of note, ED reported he had c-scope few months ago that was concerning for malignancy. Patient denied ever having colonoscopy and was surprised to hear that today's CT result is concerning for malignancy. (22 Apr 2024 23:33)    Pt was admitted under Medicine for further evaluation and management. Amlodipine started for HTN. GI was consulted. 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 Biopsied. Tattooed. Likely the source of recent hematochezia. Diverticulosis in the sigmoid colon. Surg oncology was consulted. CEA and AFP within normal limits.     Pt was taken to the OR on 5/1/24, and is s/p Lap LAR, with DLI creation. The patient tolerated the procedure well (see operative report for full details). Pt was transferred to the PACU in stable condition. In the PACU, the patient's pain was controlled and vitals stable. The patient was given clear liquids with Ensure Clears in PACU, and encouraged with early ambulation. On POC, the patient was doing well. The patient was transferred to the surgical floor in stable condition. ERP protocol was followed. Post op lactate elevated to 2.8. S/p 500 cc bolus lactate 3.2. Patient recieved more fluids and repeat final lactate was 1.8. Pt was admitted under Green Surgery for further evaluation and management      On POD #1, pt was stable and doing well. Pt's Clark was discontinued on 5/3, and passed TOV. Once IV pain control dosing complete, pt was transitioned to oral Tylenol with Oxycodone for breakthrough pain. Diet was advanced as tolerated, and GI function returned.     Pt to be discharged with ileostomy; ileostomy teaching done.    Caprini score is*****   Penrose was discontinued on ******    Physical therapy evaluated the patient and recommended no PT needs. Patient requires home VNS for ostomy care.     On the day of discharge, the patient's vitals are stable, pain is controlled, voiding urine, passing gas/stool, tolerating a low fiber diet, and ambulating well. Pt will f/u with Dr. Styles in 1-2 weeks. Pt will f/u with PCP in 1-2 weeks. HPI: 79M PMHx HTN, T2DM, h/o prostate ca s/p RT. He's currently visiting from [place] and came to ED for BRBPR [id number] in the past few days. He reports vague abd discomfort.  Denies fever, chills, cp, sob, cough, palpitation, n/v/d. In ED, HR wnl, RR wnl, on RA. Intermittently hypertensive in the ED but spontaneously improving. CBC w/ hgb 10.6 with unknown baseline, plt wnl. Repeat CBC in 4hrs with hgb 10.7. Coag wnl. CMP unremarkable except for AST 50 and ALT 34. VBG w/ lactate 1.2. UA unremarkable. CXR clear. CT a/p concerning for malignancy in distal sigmoid colon. ED emailed GI. Of note, ED reported he had c-scope few months ago that was concerning for malignancy. Patient denied ever having colonoscopy and was surprised to hear that today's CT result is concerning for malignancy. (22 Apr 2024 23:33)    Pt was admitted under Medicine for further evaluation and management. Amlodipine started for HTN. GI was consulted. 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 Biopsied. Tattooed. Likely the source of recent hematochezia. Diverticulosis in the sigmoid colon. Surg oncology was consulted. CEA and AFP within normal limits.     Pt was taken to the OR on 5/1/24, and is s/p Lap LAR, with DLI creation. The patient tolerated the procedure well (see operative report for full details). Pt was transferred to the PACU in stable condition. In the PACU, the patient's pain was controlled and vitals stable. The patient was given clear liquids with Ensure Clears in PACU, and encouraged with early ambulation. On POC, the patient was doing well. The patient was transferred to the surgical floor in stable condition. ERP protocol was followed. Post op lactate elevated to 2.8. S/p 500 cc bolus lactate 3.2. Patient recieved more fluids and repeat final lactate was 1.8. Pt was admitted under Green Surgery for further evaluation and management      On POD #1, pt was stable and doing well. Pt's Clark was discontinued on 5/3, and passed TOV. Once IV pain control dosing complete, pt was transitioned to oral Tylenol with Oxycodone for breakthrough pain. Diet was advanced as tolerated, and GI function returned. Penrose removed prior to discharge. Ostomy RN consulted for new patient education. Physical therapy evaluated the patient and recommended no PT needs. Patient requires home VNS for ostomy care. Incentive spirometry encouraged. Labs trended daily. On the day of discharge, the patient's vitals are stable, pain is controlled, voiding urine, tolerating a low fiber diet, and ambulating well. Pt will f/u with Dr. Styles in 1-2 weeks. Pt will f/u with PCP in 1-2 weeks.

## 2024-05-03 NOTE — ADVANCED PRACTICE NURSE CONSULT - ASSESSMENT
Chart reviewed & events noted to date. In to see pt & Anamaria jones earlier today to continue ileostomy teaching. Pt awake &alert, no c/o pain, niece Anamaria at bedside. Re-introduced self &role of CWOCN. Pt & niece w/good understanding of surgical procedure including "the bag".  Demonstrated pouching system opening/ closure. Pt practiced readily with pouch, while niece observed.  Niece asked appropriate questions. Reviewed steps for pouch emptying. Pouch seal intact ;stoma pink & viable & functioning +liquid stool. Ileostomy  educational materials & supplies provided. Pt /niece encourage to review materials, practice w/ pouch & emptying w/staff.  Encouraged pt to review educ'l materials as able & assist staff w/pouch emptying. Supplies at bedside.   Discussed plan pf care w/pt, karen, & staff RN.

## 2024-05-04 LAB
ANION GAP SERPL CALC-SCNC: 11 MMOL/L — SIGNIFICANT CHANGE UP (ref 5–17)
BUN SERPL-MCNC: 12 MG/DL — SIGNIFICANT CHANGE UP (ref 7–23)
CALCIUM SERPL-MCNC: 8.7 MG/DL — SIGNIFICANT CHANGE UP (ref 8.4–10.5)
CHLORIDE SERPL-SCNC: 105 MMOL/L — SIGNIFICANT CHANGE UP (ref 96–108)
CO2 SERPL-SCNC: 23 MMOL/L — SIGNIFICANT CHANGE UP (ref 22–31)
CREAT SERPL-MCNC: 0.79 MG/DL — SIGNIFICANT CHANGE UP (ref 0.5–1.3)
EGFR: 90 ML/MIN/1.73M2 — SIGNIFICANT CHANGE UP
GLUCOSE BLDC GLUCOMTR-MCNC: 116 MG/DL — HIGH (ref 70–99)
GLUCOSE BLDC GLUCOMTR-MCNC: 121 MG/DL — HIGH (ref 70–99)
GLUCOSE BLDC GLUCOMTR-MCNC: 126 MG/DL — HIGH (ref 70–99)
GLUCOSE BLDC GLUCOMTR-MCNC: 201 MG/DL — HIGH (ref 70–99)
GLUCOSE SERPL-MCNC: 97 MG/DL — SIGNIFICANT CHANGE UP (ref 70–99)
HCT VFR BLD CALC: 28.4 % — LOW (ref 39–50)
HGB BLD-MCNC: 8.8 G/DL — LOW (ref 13–17)
MAGNESIUM SERPL-MCNC: 2 MG/DL — SIGNIFICANT CHANGE UP (ref 1.6–2.6)
MCHC RBC-ENTMCNC: 26.3 PG — LOW (ref 27–34)
MCHC RBC-ENTMCNC: 31 GM/DL — LOW (ref 32–36)
MCV RBC AUTO: 84.8 FL — SIGNIFICANT CHANGE UP (ref 80–100)
NRBC # BLD: 0 /100 WBCS — SIGNIFICANT CHANGE UP (ref 0–0)
PHOSPHATE SERPL-MCNC: 3.4 MG/DL — SIGNIFICANT CHANGE UP (ref 2.5–4.5)
PLATELET # BLD AUTO: 274 K/UL — SIGNIFICANT CHANGE UP (ref 150–400)
POTASSIUM SERPL-MCNC: 4 MMOL/L — SIGNIFICANT CHANGE UP (ref 3.5–5.3)
POTASSIUM SERPL-SCNC: 4 MMOL/L — SIGNIFICANT CHANGE UP (ref 3.5–5.3)
RBC # BLD: 3.35 M/UL — LOW (ref 4.2–5.8)
RBC # FLD: 13.6 % — SIGNIFICANT CHANGE UP (ref 10.3–14.5)
SODIUM SERPL-SCNC: 139 MMOL/L — SIGNIFICANT CHANGE UP (ref 135–145)
WBC # BLD: 7.64 K/UL — SIGNIFICANT CHANGE UP (ref 3.8–10.5)
WBC # FLD AUTO: 7.64 K/UL — SIGNIFICANT CHANGE UP (ref 3.8–10.5)

## 2024-05-04 RX ADMIN — Medication 1000 MILLIGRAM(S): at 13:23

## 2024-05-04 RX ADMIN — Medication 1000 MILLIGRAM(S): at 08:58

## 2024-05-04 RX ADMIN — HEPARIN SODIUM 5000 UNIT(S): 5000 INJECTION INTRAVENOUS; SUBCUTANEOUS at 12:53

## 2024-05-04 RX ADMIN — AMLODIPINE BESYLATE 5 MILLIGRAM(S): 2.5 TABLET ORAL at 05:15

## 2024-05-04 RX ADMIN — BUDESONIDE AND FORMOTEROL FUMARATE DIHYDRATE 2 PUFF(S): 160; 4.5 AEROSOL RESPIRATORY (INHALATION) at 05:13

## 2024-05-04 RX ADMIN — LATANOPROST 1 DROP(S): 0.05 SOLUTION/ DROPS OPHTHALMIC; TOPICAL at 12:53

## 2024-05-04 RX ADMIN — Medication 3 MILLIGRAM(S): at 00:21

## 2024-05-04 RX ADMIN — Medication 1000 MILLIGRAM(S): at 12:53

## 2024-05-04 RX ADMIN — Medication 1000 MILLIGRAM(S): at 00:54

## 2024-05-04 RX ADMIN — Medication 1000 MILLIGRAM(S): at 21:38

## 2024-05-04 RX ADMIN — Medication 1000 MILLIGRAM(S): at 22:08

## 2024-05-04 RX ADMIN — BUDESONIDE AND FORMOTEROL FUMARATE DIHYDRATE 2 PUFF(S): 160; 4.5 AEROSOL RESPIRATORY (INHALATION) at 17:20

## 2024-05-04 RX ADMIN — Medication 3 MILLIGRAM(S): at 23:32

## 2024-05-04 RX ADMIN — Medication 15 MILLIGRAM(S): at 05:45

## 2024-05-04 RX ADMIN — DORZOLAMIDE HYDROCHLORIDE TIMOLOL MALEATE 1 DROP(S): 20; 5 SOLUTION/ DROPS OPHTHALMIC at 05:20

## 2024-05-04 RX ADMIN — Medication 1000 MILLIGRAM(S): at 00:22

## 2024-05-04 RX ADMIN — Medication 2: at 12:52

## 2024-05-04 RX ADMIN — HEPARIN SODIUM 5000 UNIT(S): 5000 INJECTION INTRAVENOUS; SUBCUTANEOUS at 21:39

## 2024-05-04 RX ADMIN — Medication 15 MILLIGRAM(S): at 05:15

## 2024-05-04 RX ADMIN — HEPARIN SODIUM 5000 UNIT(S): 5000 INJECTION INTRAVENOUS; SUBCUTANEOUS at 05:15

## 2024-05-04 RX ADMIN — Medication 1000 MILLIGRAM(S): at 08:28

## 2024-05-04 RX ADMIN — DORZOLAMIDE HYDROCHLORIDE TIMOLOL MALEATE 1 DROP(S): 20; 5 SOLUTION/ DROPS OPHTHALMIC at 17:20

## 2024-05-04 NOTE — PROGRESS NOTE ADULT - SUBJECTIVE AND OBJECTIVE BOX
Surgery Progress Note     Subjective:  Patient seen and examined.       Vital Signs:  Vital Signs Last 24 Hrs  T(C): 36.8 (04 May 2024 00:42), Max: 37.1 (03 May 2024 08:50)  T(F): 98.2 (04 May 2024 00:42), Max: 98.7 (03 May 2024 08:50)  HR: 60 (04 May 2024 00:42) (56 - 64)  BP: 120/61 (04 May 2024 00:42) (102/58 - 151/77)  RR: 18 (04 May 2024 00:42) (18 - 18)  SpO2: 96% (04 May 2024 00:42) (95% - 99%)    Parameters below as of 04 May 2024 00:42  Patient On (Oxygen Delivery Method): nasal cannula  O2 Flow (L/min): 2      CAPILLARY BLOOD GLUCOSE      POCT Blood Glucose.: 147 mg/dL (03 May 2024 21:19)  POCT Blood Glucose.: 107 mg/dL (03 May 2024 18:09)  POCT Blood Glucose.: 155 mg/dL (03 May 2024 12:18)  POCT Blood Glucose.: 115 mg/dL (03 May 2024 07:21)      I&O's Detail    02 May 2024 07:01  -  03 May 2024 07:00  --------------------------------------------------------  IN:    Lactated Ringers: 960 mL    Oral Fluid: 600 mL  Total IN: 1560 mL    OUT:    Blood Loss (mL): 0 mL    Ileostomy (mL): 350 mL    Indwelling Catheter - Urethral (mL): 2310 mL    Voided (mL): 0 mL  Total OUT: 2660 mL    Total NET: -1100 mL      03 May 2024 07:01  -  04 May 2024 00:58  --------------------------------------------------------  IN:    Oral Fluid: 480 mL  Total IN: 480 mL    OUT:    Blood Loss (mL): 0 mL    Ileostomy (mL): 570 mL    Voided (mL): 1050 mL  Total OUT: 1620 mL    Total NET: -1140 mL            Physical Exam:  General: NAD, resting comfortably in bed  Respiratory: Nonlabored respirations  Abdomen: soft distended, appropriately tender, surgical incisions are c/d/i. penrose in place.  Ostomy in place with liquid stool output, no gas  Ext: warm and well perfused.       Labs:    05-03    138  |  105  |  14  ----------------------------<  100<H>  4.0   |  24  |  0.99    Ca    8.5      03 May 2024 07:03  Phos  3.1     05-03  Mg     2.2     05-03                              8.7    7.69  )-----------( 247      ( 03 May 2024 07:14 )             27.1          Surgery Progress Note     Subjective:  ALEXANDERJuan Pablo ALTAGRACIANIGHAT. Passed TOV yesterday. Feeling well this AM.      Vital Signs:  Vital Signs Last 24 Hrs  T(C): 36.8 (04 May 2024 00:42), Max: 37.1 (03 May 2024 08:50)  T(F): 98.2 (04 May 2024 00:42), Max: 98.7 (03 May 2024 08:50)  HR: 60 (04 May 2024 00:42) (56 - 64)  BP: 120/61 (04 May 2024 00:42) (102/58 - 151/77)  RR: 18 (04 May 2024 00:42) (18 - 18)  SpO2: 96% (04 May 2024 00:42) (95% - 99%)    Parameters below as of 04 May 2024 00:42  Patient On (Oxygen Delivery Method): nasal cannula  O2 Flow (L/min): 2      CAPILLARY BLOOD GLUCOSE      POCT Blood Glucose.: 147 mg/dL (03 May 2024 21:19)  POCT Blood Glucose.: 107 mg/dL (03 May 2024 18:09)  POCT Blood Glucose.: 155 mg/dL (03 May 2024 12:18)  POCT Blood Glucose.: 115 mg/dL (03 May 2024 07:21)      I&O's Detail    02 May 2024 07:01  -  03 May 2024 07:00  --------------------------------------------------------  IN:    Lactated Ringers: 960 mL    Oral Fluid: 600 mL  Total IN: 1560 mL    OUT:    Blood Loss (mL): 0 mL    Ileostomy (mL): 350 mL    Indwelling Catheter - Urethral (mL): 2310 mL    Voided (mL): 0 mL  Total OUT: 2660 mL    Total NET: -1100 mL      03 May 2024 07:01  -  04 May 2024 00:58  --------------------------------------------------------  IN:    Oral Fluid: 480 mL  Total IN: 480 mL    OUT:    Blood Loss (mL): 0 mL    Ileostomy (mL): 570 mL    Voided (mL): 1050 mL  Total OUT: 1620 mL    Total NET: -1140 mL            Physical Exam:  General: NAD, resting comfortably in bed  Respiratory: Nonlabored respirations  Abdomen: soft distended, appropriately tender, surgical incisions are c/d/i. penrose in place.  Ostomy in place with liquid stool output, no gas  Ext: warm and well perfused.       Labs:    05-03    138  |  105  |  14  ----------------------------<  100<H>  4.0   |  24  |  0.99    Ca    8.5      03 May 2024 07:03  Phos  3.1     05-03  Mg     2.2     05-03                              8.7    7.69  )-----------( 247      ( 03 May 2024 07:14 )             27.1

## 2024-05-04 NOTE — PROGRESS NOTE ADULT - ASSESSMENT
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 S/P Lap LAR, with DLI creation on 5/1     Plan:  - ERP  - Pain control PRN  - Diet: LRD  - Activity: as tolerated   - dc penrose upon dc  - DVT ppx  - stoma education  - PT- NPT    Surgery [place] Team  [phone]. 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 S/P Lap LAR, with DLI creation on 5/1     Plan:  - ERP  - Pain control PRN  - Diet: LRD  - Activity: as tolerated   - dc penrose upon dc  - DVT ppx  - stoma education  - PT- NPT  - Dispo: dc on Monday    Surgery Green Team  [phone].

## 2024-05-05 LAB
ANION GAP SERPL CALC-SCNC: 11 MMOL/L — SIGNIFICANT CHANGE UP (ref 5–17)
BUN SERPL-MCNC: 15 MG/DL — SIGNIFICANT CHANGE UP (ref 7–23)
CALCIUM SERPL-MCNC: 9 MG/DL — SIGNIFICANT CHANGE UP (ref 8.4–10.5)
CHLORIDE SERPL-SCNC: 106 MMOL/L — SIGNIFICANT CHANGE UP (ref 96–108)
CO2 SERPL-SCNC: 22 MMOL/L — SIGNIFICANT CHANGE UP (ref 22–31)
CREAT SERPL-MCNC: 0.86 MG/DL — SIGNIFICANT CHANGE UP (ref 0.5–1.3)
EGFR: 88 ML/MIN/1.73M2 — SIGNIFICANT CHANGE UP
GLUCOSE BLDC GLUCOMTR-MCNC: 120 MG/DL — HIGH (ref 70–99)
GLUCOSE BLDC GLUCOMTR-MCNC: 127 MG/DL — HIGH (ref 70–99)
GLUCOSE BLDC GLUCOMTR-MCNC: 165 MG/DL — HIGH (ref 70–99)
GLUCOSE BLDC GLUCOMTR-MCNC: 186 MG/DL — HIGH (ref 70–99)
GLUCOSE SERPL-MCNC: 105 MG/DL — HIGH (ref 70–99)
HCT VFR BLD CALC: 29.9 % — LOW (ref 39–50)
HGB BLD-MCNC: 9.3 G/DL — LOW (ref 13–17)
MAGNESIUM SERPL-MCNC: 2.2 MG/DL — SIGNIFICANT CHANGE UP (ref 1.6–2.6)
MCHC RBC-ENTMCNC: 26.1 PG — LOW (ref 27–34)
MCHC RBC-ENTMCNC: 31.1 GM/DL — LOW (ref 32–36)
MCV RBC AUTO: 84 FL — SIGNIFICANT CHANGE UP (ref 80–100)
NRBC # BLD: 0 /100 WBCS — SIGNIFICANT CHANGE UP (ref 0–0)
PHOSPHATE SERPL-MCNC: 3.8 MG/DL — SIGNIFICANT CHANGE UP (ref 2.5–4.5)
PLATELET # BLD AUTO: 298 K/UL — SIGNIFICANT CHANGE UP (ref 150–400)
POTASSIUM SERPL-MCNC: 4.3 MMOL/L — SIGNIFICANT CHANGE UP (ref 3.5–5.3)
POTASSIUM SERPL-SCNC: 4.3 MMOL/L — SIGNIFICANT CHANGE UP (ref 3.5–5.3)
RBC # BLD: 3.56 M/UL — LOW (ref 4.2–5.8)
RBC # FLD: 13.4 % — SIGNIFICANT CHANGE UP (ref 10.3–14.5)
SODIUM SERPL-SCNC: 139 MMOL/L — SIGNIFICANT CHANGE UP (ref 135–145)
WBC # BLD: 8.44 K/UL — SIGNIFICANT CHANGE UP (ref 3.8–10.5)
WBC # FLD AUTO: 8.44 K/UL — SIGNIFICANT CHANGE UP (ref 3.8–10.5)

## 2024-05-05 RX ADMIN — Medication 1: at 12:57

## 2024-05-05 RX ADMIN — OXYCODONE HYDROCHLORIDE 5 MILLIGRAM(S): 5 TABLET ORAL at 22:15

## 2024-05-05 RX ADMIN — Medication 1000 MILLIGRAM(S): at 05:45

## 2024-05-05 RX ADMIN — BUDESONIDE AND FORMOTEROL FUMARATE DIHYDRATE 2 PUFF(S): 160; 4.5 AEROSOL RESPIRATORY (INHALATION) at 17:31

## 2024-05-05 RX ADMIN — Medication 1000 MILLIGRAM(S): at 12:01

## 2024-05-05 RX ADMIN — AMLODIPINE BESYLATE 5 MILLIGRAM(S): 2.5 TABLET ORAL at 05:45

## 2024-05-05 RX ADMIN — Medication 3 MILLIGRAM(S): at 21:38

## 2024-05-05 RX ADMIN — HEPARIN SODIUM 5000 UNIT(S): 5000 INJECTION INTRAVENOUS; SUBCUTANEOUS at 13:19

## 2024-05-05 RX ADMIN — BUDESONIDE AND FORMOTEROL FUMARATE DIHYDRATE 2 PUFF(S): 160; 4.5 AEROSOL RESPIRATORY (INHALATION) at 05:45

## 2024-05-05 RX ADMIN — HEPARIN SODIUM 5000 UNIT(S): 5000 INJECTION INTRAVENOUS; SUBCUTANEOUS at 05:46

## 2024-05-05 RX ADMIN — Medication 1000 MILLIGRAM(S): at 23:42

## 2024-05-05 RX ADMIN — OXYCODONE HYDROCHLORIDE 5 MILLIGRAM(S): 5 TABLET ORAL at 22:45

## 2024-05-05 RX ADMIN — DORZOLAMIDE HYDROCHLORIDE TIMOLOL MALEATE 1 DROP(S): 20; 5 SOLUTION/ DROPS OPHTHALMIC at 17:30

## 2024-05-05 RX ADMIN — LATANOPROST 1 DROP(S): 0.05 SOLUTION/ DROPS OPHTHALMIC; TOPICAL at 12:01

## 2024-05-05 RX ADMIN — Medication 1000 MILLIGRAM(S): at 17:30

## 2024-05-05 RX ADMIN — DORZOLAMIDE HYDROCHLORIDE TIMOLOL MALEATE 1 DROP(S): 20; 5 SOLUTION/ DROPS OPHTHALMIC at 05:45

## 2024-05-05 RX ADMIN — HEPARIN SODIUM 5000 UNIT(S): 5000 INJECTION INTRAVENOUS; SUBCUTANEOUS at 21:25

## 2024-05-05 NOTE — PROGRESS NOTE ADULT - ASSESSMENT
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 S/P Lap LAR, with DLI creation on 5/1     Plan:  - ERP  - Pain control PRN  - Diet: LRD  - Activity: as tolerated   - dc penrose upon dc  - DVT ppx  - stoma education  - PT- NPT  - Dispo: dc on Monday    Surgery Green Team  [phone]. 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 S/P Lap LAR, with DLI creation on 5/1     Plan:  - C/w ERP  - Pain control PRN  - Diet: LRD  - Activity: as tolerated   - dc penrose upon dc  - DVT ppx  - stoma education  - PT- NPT  - Dispo: dc on Monday    Surgery Green Team  [phone].

## 2024-05-05 NOTE — PROGRESS NOTE ADULT - SUBJECTIVE AND OBJECTIVE BOX
Surgery Progress Note    OVERNIGHT EVENTS: NAEO    SUBJECTIVE: Pt seen and examined at bedside. Patient comfortable and in no-apparent distress. Pain is controlled.    Vital Signs Last 24 Hrs  T(C): 36.9 (05 May 2024 00:37), Max: 36.9 (04 May 2024 08:57)  T(F): 98.5 (05 May 2024 00:37), Max: 98.5 (04 May 2024 08:57)  HR: 59 (05 May 2024 00:37) (52 - 62)  BP: 129/67 (05 May 2024 00:37) (113/63 - 149/70)  BP(mean): --  RR: 18 (05 May 2024 00:37) (18 - 18)  SpO2: 98% (05 May 2024 00:37) (92% - 98%)    Parameters below as of 05 May 2024 00:37  Patient On (Oxygen Delivery Method): room air        Physical Exam:  General: NAD, resting comfortably in bed  Respiratory: Nonlabored respirations  Abdomen: soft distended, appropriately tender, surgical incisions are c/d/i. penrose in place.  Ostomy in place with liquid stool output, no gas  Ext: warm and well perfused.     INs and OUTs:    05-03-24 @ 07:01  -  05-04-24 @ 07:00  --------------------------------------------------------  IN: 960 mL / OUT: 2070 mL / NET: -1110 mL    05-04-24 @ 07:01  -  05-05-24 @ 04:46  --------------------------------------------------------  IN: 480 mL / OUT: 2100 mL / NET: -1620 mL        LABS:                        8.8    7.64  )-----------( 274      ( 04 May 2024 07:31 )             28.4     05-04    139  |  105  |  12  ----------------------------<  97  4.0   |  23  |  0.79    Ca    8.7      04 May 2024 07:29  Phos  3.4     05-04  Mg     2.0     05-04        Urinalysis Basic - ( 04 May 2024 07:29 )    Color: x / Appearance: x / SG: x / pH: x  Gluc: 97 mg/dL / Ketone: x  / Bili: x / Urobili: x   Blood: x / Protein: x / Nitrite: x   Leuk Esterase: x / RBC: x / WBC x   Sq Epi: x / Non Sq Epi: x / Bacteria: x         Surgery Progress Note    OVERNIGHT EVENTS: NAEO    SUBJECTIVE: Pt seen and examined at bedside. Patient comfortable and in no-apparent distress. Denies nausea/vomit, fever/chills. sob/chest pain. Has been ambulating, urinating with no problem and reports a one episode of bowel movement from the rectum.    Vital Signs Last 24 Hrs  T(C): 36.9 (05 May 2024 00:37), Max: 36.9 (04 May 2024 08:57)  T(F): 98.5 (05 May 2024 00:37), Max: 98.5 (04 May 2024 08:57)  HR: 59 (05 May 2024 00:37) (52 - 62)  BP: 129/67 (05 May 2024 00:37) (113/63 - 149/70)  BP(mean): --  RR: 18 (05 May 2024 00:37) (18 - 18)  SpO2: 98% (05 May 2024 00:37) (92% - 98%)    Parameters below as of 05 May 2024 00:37  Patient On (Oxygen Delivery Method): room air        Physical Exam:  General: NAD, resting comfortably in bed  Respiratory: Nonlabored respirations  Abdomen: soft distended, appropriately tender, surgical incisions are c/d/i. penrose in place.  Ostomy in place with liquid stool output, no gas  Ext: warm and well perfused.     INs and OUTs:    05-03-24 @ 07:01  -  05-04-24 @ 07:00  --------------------------------------------------------  IN: 960 mL / OUT: 2070 mL / NET: -1110 mL    05-04-24 @ 07:01  -  05-05-24 @ 04:46  --------------------------------------------------------  IN: 480 mL / OUT: 2100 mL / NET: -1620 mL        LABS:                        8.8    7.64  )-----------( 274      ( 04 May 2024 07:31 )             28.4     05-04    139  |  105  |  12  ----------------------------<  97  4.0   |  23  |  0.79    Ca    8.7      04 May 2024 07:29  Phos  3.4     05-04  Mg     2.0     05-04        Urinalysis Basic - ( 04 May 2024 07:29 )    Color: x / Appearance: x / SG: x / pH: x  Gluc: 97 mg/dL / Ketone: x  / Bili: x / Urobili: x   Blood: x / Protein: x / Nitrite: x   Leuk Esterase: x / RBC: x / WBC x   Sq Epi: x / Non Sq Epi: x / Bacteria: x

## 2024-05-06 ENCOUNTER — TRANSCRIPTION ENCOUNTER (OUTPATIENT)
Age: 79
End: 2024-05-06

## 2024-05-06 VITALS
OXYGEN SATURATION: 100 % | TEMPERATURE: 98 F | SYSTOLIC BLOOD PRESSURE: 169 MMHG | DIASTOLIC BLOOD PRESSURE: 67 MMHG | RESPIRATION RATE: 18 BRPM | HEART RATE: 65 BPM

## 2024-05-06 LAB
GLUCOSE BLDC GLUCOMTR-MCNC: 109 MG/DL — HIGH (ref 70–99)
GLUCOSE BLDC GLUCOMTR-MCNC: 121 MG/DL — HIGH (ref 70–99)

## 2024-05-06 PROCEDURE — 71250 CT THORAX DX C-: CPT | Mod: MC

## 2024-05-06 PROCEDURE — 81003 URINALYSIS AUTO W/O SCOPE: CPT

## 2024-05-06 PROCEDURE — C1769: CPT

## 2024-05-06 PROCEDURE — 88305 TISSUE EXAM BY PATHOLOGIST: CPT

## 2024-05-06 PROCEDURE — 86850 RBC ANTIBODY SCREEN: CPT

## 2024-05-06 PROCEDURE — 99285 EMERGENCY DEPT VISIT HI MDM: CPT

## 2024-05-06 PROCEDURE — 82272 OCCULT BLD FECES 1-3 TESTS: CPT

## 2024-05-06 PROCEDURE — 83605 ASSAY OF LACTIC ACID: CPT

## 2024-05-06 PROCEDURE — 85025 COMPLETE CBC W/AUTO DIFF WBC: CPT

## 2024-05-06 PROCEDURE — 71045 X-RAY EXAM CHEST 1 VIEW: CPT

## 2024-05-06 PROCEDURE — 82962 GLUCOSE BLOOD TEST: CPT

## 2024-05-06 PROCEDURE — 85018 HEMOGLOBIN: CPT

## 2024-05-06 PROCEDURE — 88341 IMHCHEM/IMCYTCHM EA ADD ANTB: CPT

## 2024-05-06 PROCEDURE — 85027 COMPLETE CBC AUTOMATED: CPT

## 2024-05-06 PROCEDURE — 86900 BLOOD TYPING SEROLOGIC ABO: CPT

## 2024-05-06 PROCEDURE — 88309 TISSUE EXAM BY PATHOLOGIST: CPT

## 2024-05-06 PROCEDURE — 80048 BASIC METABOLIC PNL TOTAL CA: CPT

## 2024-05-06 PROCEDURE — C1889: CPT

## 2024-05-06 PROCEDURE — 82803 BLOOD GASES ANY COMBINATION: CPT

## 2024-05-06 PROCEDURE — 85610 PROTHROMBIN TIME: CPT

## 2024-05-06 PROCEDURE — 93356 MYOCRD STRAIN IMG SPCKL TRCK: CPT

## 2024-05-06 PROCEDURE — C9399: CPT

## 2024-05-06 PROCEDURE — 85014 HEMATOCRIT: CPT

## 2024-05-06 PROCEDURE — 86901 BLOOD TYPING SEROLOGIC RH(D): CPT

## 2024-05-06 PROCEDURE — 84132 ASSAY OF SERUM POTASSIUM: CPT

## 2024-05-06 PROCEDURE — 82947 ASSAY GLUCOSE BLOOD QUANT: CPT

## 2024-05-06 PROCEDURE — 84295 ASSAY OF SERUM SODIUM: CPT

## 2024-05-06 PROCEDURE — C1758: CPT

## 2024-05-06 PROCEDURE — 83735 ASSAY OF MAGNESIUM: CPT

## 2024-05-06 PROCEDURE — 74177 CT ABD & PELVIS W/CONTRAST: CPT | Mod: MC

## 2024-05-06 PROCEDURE — C1765: CPT

## 2024-05-06 PROCEDURE — 82378 CARCINOEMBRYONIC ANTIGEN: CPT

## 2024-05-06 PROCEDURE — 86803 HEPATITIS C AB TEST: CPT

## 2024-05-06 PROCEDURE — 94640 AIRWAY INHALATION TREATMENT: CPT

## 2024-05-06 PROCEDURE — 84100 ASSAY OF PHOSPHORUS: CPT

## 2024-05-06 PROCEDURE — 82330 ASSAY OF CALCIUM: CPT

## 2024-05-06 PROCEDURE — 83036 HEMOGLOBIN GLYCOSYLATED A1C: CPT

## 2024-05-06 PROCEDURE — 85730 THROMBOPLASTIN TIME PARTIAL: CPT

## 2024-05-06 PROCEDURE — 83690 ASSAY OF LIPASE: CPT

## 2024-05-06 PROCEDURE — 97161 PT EVAL LOW COMPLEX 20 MIN: CPT

## 2024-05-06 PROCEDURE — 88342 IMHCHEM/IMCYTCHM 1ST ANTB: CPT

## 2024-05-06 PROCEDURE — 82105 ALPHA-FETOPROTEIN SERUM: CPT

## 2024-05-06 PROCEDURE — 87086 URINE CULTURE/COLONY COUNT: CPT

## 2024-05-06 PROCEDURE — 80076 HEPATIC FUNCTION PANEL: CPT

## 2024-05-06 PROCEDURE — 36415 COLL VENOUS BLD VENIPUNCTURE: CPT

## 2024-05-06 PROCEDURE — 93306 TTE W/DOPPLER COMPLETE: CPT

## 2024-05-06 PROCEDURE — 82435 ASSAY OF BLOOD CHLORIDE: CPT

## 2024-05-06 PROCEDURE — 80053 COMPREHEN METABOLIC PANEL: CPT

## 2024-05-06 RX ORDER — OXYCODONE HYDROCHLORIDE 5 MG/1
1 TABLET ORAL
Qty: 5 | Refills: 0
Start: 2024-05-06

## 2024-05-06 RX ORDER — ACETAMINOPHEN 500 MG
2 TABLET ORAL
Qty: 0 | Refills: 0 | DISCHARGE
Start: 2024-05-06

## 2024-05-06 RX ORDER — KETOROLAC TROMETHAMINE 30 MG/ML
15 SYRINGE (ML) INJECTION ONCE
Refills: 0 | Status: DISCONTINUED | OUTPATIENT
Start: 2024-05-06 | End: 2024-05-06

## 2024-05-06 RX ORDER — DORZOLAMIDE HYDROCHLORIDE TIMOLOL MALEATE 20; 5 MG/ML; MG/ML
1 SOLUTION/ DROPS OPHTHALMIC
Qty: 0 | Refills: 0 | DISCHARGE
Start: 2024-05-06

## 2024-05-06 RX ORDER — LATANOPROST 0.05 MG/ML
1 SOLUTION/ DROPS OPHTHALMIC; TOPICAL
Qty: 0 | Refills: 0 | DISCHARGE
Start: 2024-05-06

## 2024-05-06 RX ORDER — BUDESONIDE AND FORMOTEROL FUMARATE DIHYDRATE 160; 4.5 UG/1; UG/1
2 AEROSOL RESPIRATORY (INHALATION)
Qty: 0 | Refills: 0 | DISCHARGE
Start: 2024-05-06

## 2024-05-06 RX ORDER — IBUPROFEN 200 MG
1 TABLET ORAL
Qty: 0 | Refills: 0 | DISCHARGE

## 2024-05-06 RX ADMIN — HEPARIN SODIUM 5000 UNIT(S): 5000 INJECTION INTRAVENOUS; SUBCUTANEOUS at 13:06

## 2024-05-06 RX ADMIN — HEPARIN SODIUM 5000 UNIT(S): 5000 INJECTION INTRAVENOUS; SUBCUTANEOUS at 05:18

## 2024-05-06 RX ADMIN — Medication 1000 MILLIGRAM(S): at 11:14

## 2024-05-06 RX ADMIN — DORZOLAMIDE HYDROCHLORIDE TIMOLOL MALEATE 1 DROP(S): 20; 5 SOLUTION/ DROPS OPHTHALMIC at 05:19

## 2024-05-06 RX ADMIN — Medication 1000 MILLIGRAM(S): at 05:50

## 2024-05-06 RX ADMIN — Medication 15 MILLIGRAM(S): at 13:06

## 2024-05-06 RX ADMIN — LATANOPROST 1 DROP(S): 0.05 SOLUTION/ DROPS OPHTHALMIC; TOPICAL at 11:15

## 2024-05-06 RX ADMIN — BUDESONIDE AND FORMOTEROL FUMARATE DIHYDRATE 2 PUFF(S): 160; 4.5 AEROSOL RESPIRATORY (INHALATION) at 05:19

## 2024-05-06 RX ADMIN — AMLODIPINE BESYLATE 5 MILLIGRAM(S): 2.5 TABLET ORAL at 05:19

## 2024-05-06 RX ADMIN — Medication 1000 MILLIGRAM(S): at 00:20

## 2024-05-06 RX ADMIN — Medication 1000 MILLIGRAM(S): at 05:19

## 2024-05-06 NOTE — DISCHARGE NOTE NURSING/CASE MANAGEMENT/SOCIAL WORK - PATIENT PORTAL LINK FT
You can access the FollowMyHealth Patient Portal offered by [facility] by registering at the following website: [email or web address]. By joining [facility]’s FollowMyHealth portal, you will also be able to view your health information using other applications (apps) compatible with our system.

## 2024-05-06 NOTE — PROGRESS NOTE ADULT - ATTENDING COMMENTS
Agree with above. 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 Will await pathology results, and potential surgical intervention pending imaging/metastatic workup as above.
Has some incisional pain.  Abdomen soft, mildly tender, ND.  Ileostomy pink, functioning.  Trial of LRD.  D/c Clark.  Continue ostomy care teaching.
I saw and examined the pt today at 8:25 am.  Comfortable.  Abdomen soft, NT, ND.  I discussed his dx and tx plan with the pt and his niece Anamaria as per his request.  Niece reports he has been having abd pain and large rectal bleeding.  H/o prostate Ca tx with RT one year ago, has been on Zoladex, last dose 3 weeks ago, next dose is supposed to be in June.  Has partially obstructing colon mass causing bleeding, anemia.  Recommended lap resection of rectosigmoid with intent for anastomosis. Discussed the possibility for need for diverting loop ileostomy if intraop the rectum appears affected by the pelvic RT. Possibility of need for colostomy is relatively low all things considered.   Will follow chest CT results, CEA.  Medical clearance.  Will look for OR time for next week.  Niece states they don't know the stage of his prostate Ca.   He needs PSA and Urology consult for input and also for intraop cysto and B/L u-caths given the pelvic RT.
No change in condition.  Surgery is planned Wednesday.
I saw and examined the pt today at 10:40 am.  + incisional pain.  No N/V.  Abdomen soft, with mild incisional tenderness, ND.  Ileostomy pink, with small output.  Stable.  Clears.  Start Toradol.  Continue Clark today.
I saw and examined the patient, and reviewed  the history with the patient and   Agree with note which was also reviewed and edited where appropriate.  D/W patient, RN, residents and Fellow
I saw and examined the patient, and reviewed  the history with the patient and   Agree with note which was also reviewed and edited where appropriate.  D/W patient, RN, residents and Fellow
I saw and examined the pt today at 10 am.  Has some incisional pain, controlled with medications.  Abdomen soft, with minimal incisional tenderness, ND.  Ileostomy working.  D/c home today. F/u in the office next week.
No new c/o.  + rectal bleeding.  Benign abdomen.  Pt with rectosigmoid mass, h/o pelvic RT for prostate Ca 1 year ago.  Looking for OR time next week.  Needs baseline Echo.  Needs Urology consult for f/u of prostate Ca and for u-caths at the time of the OR.

## 2024-05-06 NOTE — PROGRESS NOTE ADULT - ASSESSMENT
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 S/P Lap LAR, with DLI creation on 5/1     Plan:  - C/w ERP  - Pain control PRN  - Diet: LRD  - Activity: as tolerated   - dc penrose upon dc  - DVT ppx  - stoma education  - PT- NPT  - dispo planning     Surgery Green Team  [phone]. 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 S/P Lap LAR, with DLI creation on 5/1     Plan:  - Pain control PRN  - Diet: LRD  - Activity: as tolerated   - dc penrose upon dc  - DVT ppx  - Ostomy RN for continued education  - PT- no needs  - dispo planning     Surgery Green Team  [phone].

## 2024-05-06 NOTE — DISCHARGE NOTE NURSING/CASE MANAGEMENT/SOCIAL WORK - NSDCPEFALRISK_GEN_ALL_CORE
For information on Fall & Injury Prevention, visit: [email or web address] OR  [email or web address] OR  https://www.cdc.gov/steadi/patient.html

## 2024-05-06 NOTE — PROGRESS NOTE ADULT - SUBJECTIVE AND OBJECTIVE BOX
Surgery Progress Note     Subjective:  Patient seen and examined.       Vital Signs:  Vital Signs Last 24 Hrs  T(C): 36.7 (06 May 2024 04:46), Max: 37.2 (05 May 2024 16:20)  T(F): 98 (06 May 2024 04:46), Max: 98.9 (05 May 2024 16:20)  HR: 60 (06 May 2024 04:46) (51 - 66)  BP: 125/70 (06 May 2024 04:46) (124/65 - 152/73)  RR: 18 (06 May 2024 04:46) (18 - 18)  SpO2: 97% (06 May 2024 04:46) (97% - 99%)    Parameters below as of 06 May 2024 04:46  Patient On (Oxygen Delivery Method): room air        CAPILLARY BLOOD GLUCOSE      POCT Blood Glucose.: 127 mg/dL (05 May 2024 21:22)  POCT Blood Glucose.: 186 mg/dL (05 May 2024 17:03)  POCT Blood Glucose.: 165 mg/dL (05 May 2024 12:29)  POCT Blood Glucose.: 120 mg/dL (05 May 2024 08:25)      I&O's Detail    04 May 2024 07:01  -  05 May 2024 07:00  --------------------------------------------------------  IN:    Oral Fluid: 480 mL  Total IN: 480 mL    OUT:    Ileostomy (mL): 850 mL    Voided (mL): 1550 mL  Total OUT: 2400 mL    Total NET: -1920 mL      05 May 2024 07:01  -  06 May 2024 05:32  --------------------------------------------------------  IN:    Oral Fluid: 720 mL  Total IN: 720 mL    OUT:    Blood Loss (mL): 0 mL    Ileostomy (mL): 725 mL    Voided (mL): 1050 mL  Total OUT: 1775 mL    Total NET: -1055 mL            Physical Exam:  General: NAD, resting comfortably in bed  Respiratory: Nonlabored respirations  Abdomen: soft distended, appropriately tender, surgical incisions are c/d/i. penrose in place.  Ostomy in place with ****  Ext: warm and well perfused.       Labs:    05-05    139  |  106  |  15  ----------------------------<  105<H>  4.3   |  22  |  0.86    Ca    9.0      05 May 2024 06:57  Phos  3.8     05-05  Mg     2.2     05-05                              9.3    8.44  )-----------( 298      ( 05 May 2024 06:51 )             29.9          Surgery Progress Note     Subjective:  Patient seen and examined on AM rounds. Tolerating diet.     Vital Signs:  Vital Signs Last 24 Hrs  T(C): 36.7 (06 May 2024 04:46), Max: 37.2 (05 May 2024 16:20)  T(F): 98 (06 May 2024 04:46), Max: 98.9 (05 May 2024 16:20)  HR: 60 (06 May 2024 04:46) (51 - 66)  BP: 125/70 (06 May 2024 04:46) (124/65 - 152/73)  RR: 18 (06 May 2024 04:46) (18 - 18)  SpO2: 97% (06 May 2024 04:46) (97% - 99%)    Parameters below as of 06 May 2024 04:46  Patient On (Oxygen Delivery Method): room air    CAPILLARY BLOOD GLUCOSE  POCT Blood Glucose.: 127 mg/dL (05 May 2024 21:22)  POCT Blood Glucose.: 186 mg/dL (05 May 2024 17:03)  POCT Blood Glucose.: 165 mg/dL (05 May 2024 12:29)  POCT Blood Glucose.: 120 mg/dL (05 May 2024 08:25)      I&O's Detail    04 May 2024 07:01  -  05 May 2024 07:00  --------------------------------------------------------  IN:    Oral Fluid: 480 mL  Total IN: 480 mL    OUT:    Ileostomy (mL): 850 mL    Voided (mL): 1550 mL  Total OUT: 2400 mL    Total NET: -1920 mL      05 May 2024 07:01  -  06 May 2024 05:32  --------------------------------------------------------  IN:    Oral Fluid: 720 mL  Total IN: 720 mL    OUT:    Blood Loss (mL): 0 mL    Ileostomy (mL): 725 mL    Voided (mL): 1050 mL  Total OUT: 1775 mL    Total NET: -1055 mL      Physical Exam:  General: NAD, resting comfortably in bed  Respiratory: Nonlabored respirations  Abdomen: soft distended, appropriately tender, surgical incisions are c/d/i w/ penrose. Ostomy in place  Ext: warm and well perfused.       Labs:    05-05    139  |  106  |  15  ----------------------------<  105<H>  4.3   |  22  |  0.86    Ca    9.0      05 May 2024 06:57  Phos  3.8     05-05  Mg     2.2     05-05                              9.3    8.44  )-----------( 298      ( 05 May 2024 06:51 )             29.9

## 2024-05-06 NOTE — ADVANCED PRACTICE NURSE CONSULT - RECOMMEDATIONS
Recommendation:  · Recommendations	  Will recommend:  1. Encourage self care -participation w/ emptying .  2. Empty pouch when 1/3-1/2 full   3. Change pouching system every 3-4 days & prn leakage  4. Reinforce ostomy teaching w/patient (to be independent w/emptying for Monday as d/c planning home)  5. Contact ostomy specialists if questions, concerns/issues .  6. Supplies: [place] 2 1/4" Ceraplus soft convex skin barrier (#46212), [place] 2 1/4" drainable pouch (#82485); Accessory products:  stoma paste #[id number], stoma powder (#7906) & Cavilon No sting barrier film wipe (#3344)  Will f/u on Monday. Support & encouragement provided throughout visit.  Will recommend:  1. Encourage self care -participation w/ emptying .  2. Empty pouch when 1/3-1/2 full   3. Change pouching system every 3-4 days & prn leakage  4. Reinforce ostomy teaching w/patient (to be independent w/emptying for Monday as d/c planning home)  5. Contact ostomy specialists if questions, concerns/issues .  6. Supplies: [place] 2 1/4" Ceraplus soft convex skin barrier (#38234), [place] 2 1/4" drainable pouch (#91200); Accessory products:  stoma paste #[id number], stoma powder (#7906) & Cavilon No sting barrier film wipe (#3344)  Will f/u on Monday. Support & encouragement provided throughout visit.      Will recommend:  1. Encourage self care -participation w/ emptying .  2. Empty pouch when 1/3-1/2 full   3. Change pouching system every 3-4 days & prn leakage  4. Reinforce ostomy teaching w/patient (to be independent w/emptying for Monday as d/c planning home)  5. Contact ostomy specialists if questions, concerns/issues .  6. Supplies: [place] 2 1/4" Ceraplus soft convex skin barrier (#23945), [place] 2 1/4" drainable pouch (#84567); Accessory products:  stoma paste #[id number], stoma powder (#7906) & Cavilon No sting barrier film wipe (#3344)  Will f/u on Monday. Support & encouragement provided throughout visit.  Will recommend:  1. Encourage self care -participation w/ emptying .  2. Empty pouch when 1/3-1/2 full   3. Change pouching system every 3-4 days & prn leakage  4. Reinforce ostomy teaching w/patient (to be independent w/emptying for Monday as d/c planning home)  5. Contact ostomy specialists if questions, concerns/issues .  6. Supplies: [place] 2 1/4" Ceraplus soft convex skin barrier (#92185), [place] 2 1/4" drainable pouch (#17741); Accessory products:  stoma paste #[id number], stoma powder (#7906) & Cavilon No sting barrier film wipe (#3344)  Will f/u on Monday. Support & encouragement provided throughout visit.      Will recommend:  1. Encourage self care -participation w/ emptying .  2. Empty pouch when 1/3-1/2 full   3. Change pouching system every 3-4 days & prn leakage  4. Reinforce ostomy teaching w/patient (to be independent w/emptying for Monday as d/c planning home)  5. Contact ostomy specialists if questions, concerns/issues .  6. Supplies: [place] 2 1/4" Ceraplus soft convex skin barrier (#36289), [place] 2 1/4" drainable pouch (#43424); Accessory products:  stoma paste #[id number], stoma powder (#9905) & Cavilon No sting barrier film wipe (#9270)

## 2024-05-06 NOTE — ADVANCED PRACTICE NURSE CONSULT - ASSESSMENT
Chart reviewed & events noted to date. Ostomy RNs in to see pt & Anamaria jones (virtually) to continue ileostomy teaching. Pt awake &alert, no c/o discomfort. Abd soft non distended. Pt reports participation with emptying "I know how to do it", did it a couple of times".     Discussed plan pf care w/ptkaren, & staff RN.                Chart reviewed & events noted to date. Ostomy RNs in to see pt & Anamaria jones (virtually) to continue ileostomy teaching. Pt awake &alert, no c/o discomfort. Abd soft non distended. Pt reports participation with emptying "I know how to do it", did it a couple of times".  Complete pouching system change. Pt & niece observed as reviewed steps for change. Pt participated w/ snapping pouch to skin barrier & closing pouch end. RLQ loop ileostomy 1 1/8"L x 1 5/8"W pink & viable. Peristomal skin & mucocutaneous junction intact. Skin creases noted at 3&9 o'clock,. Repouched w/ 2 1/4" soft convex skin barrier , bead of stoma paste applied to skin creases to level surface &at the back of skin barrier to caulk & drainable pouch. Pt & niece receptive to teaching & asked appropriate questions.   Reviewed & discussed dietary progression,cautions & ileostomy diet modifications including low fiber, importance of hydration, chewing food thoroughly, & foods to help thicken output. Pt is diabetic & will recommend f/u w/dietician. Discussed product #s,supply info & ordering process. Pt/niece express understanding. Pt encourage to continue participation with emptying. OOB & ambulation to increase bowel functions. Emotional support provided.    Discussed plan pf care w/ptkaren, & staff RN.

## 2024-05-13 LAB — SURGICAL PATHOLOGY STUDY: SIGNIFICANT CHANGE UP

## 2024-05-15 ENCOUNTER — APPOINTMENT (OUTPATIENT)
Dept: SURGERY | Facility: CLINIC | Age: 79
End: 2024-05-15
Payer: MEDICAID

## 2024-05-15 VITALS
HEART RATE: 73 BPM | TEMPERATURE: 96.7 F | OXYGEN SATURATION: 99 % | BODY MASS INDEX: 30.73 KG/M2 | SYSTOLIC BLOOD PRESSURE: 128 MMHG | DIASTOLIC BLOOD PRESSURE: 75 MMHG | HEIGHT: 64 IN | RESPIRATION RATE: 16 BRPM | WEIGHT: 180 LBS

## 2024-05-15 PROCEDURE — 99024 POSTOP FOLLOW-UP VISIT: CPT

## 2024-05-22 RX ORDER — NYSTATIN 100000 1/G
100000 POWDER TOPICAL DAILY
Qty: 1 | Refills: 0 | Status: ACTIVE | COMMUNITY
Start: 2024-05-22 | End: 1900-01-01

## 2024-05-25 NOTE — ASSESSMENT
[FreeTextEntry1] : has incisional discomfort, pain meds control it.  Comfortable. Abdomen soft, non-tender, non-distended. Port incisions and midline extraction wound healing well. Staples removed. No hernias. Ileostomy pink, functioning.  Doing well postop. Path discussed.  Instructions for diet, activity, wound care given, all questions answered. RTO in 2 weeks.

## 2024-05-25 NOTE — HISTORY OF PRESENT ILLNESS
[FreeTextEntry1] : Luis Fernando is a 78 y/o male here for a post-op visit, s/p Laparoscopic low anterior resection, Ileostomy on 5/1/24.  Pathology - 1. Rectosigmoid, low-anterior resection: - Adenocarcinoma, moderately differentiated of the rectosigmoid. - Negative for vascular or perineural invasion. - Nineteen lymph nodes, negative for carcinoma (0/19). - All margins are negative for dysplasia or malignancy. - See synoptic summary section for further details 2. Proximal and distal donut, resection: - Two colonic donuts negative for dysplasia or malignancy.  Today pt reports no pain. Pain managed with Tylenol. Surgical incision is clean, dry, and intact. Denies nausea and vomiting. Denies fever and chills. Empty bag 3-4 times, doesn't have anything coming out of rectum, stoma looks red and normal. Has a nurse that comes to help change bag. Low appetite. Feels weakness and tired.

## 2024-05-29 ENCOUNTER — APPOINTMENT (OUTPATIENT)
Dept: SURGERY | Facility: CLINIC | Age: 79
End: 2024-05-29
Payer: MEDICAID

## 2024-05-29 VITALS
HEART RATE: 72 BPM | RESPIRATION RATE: 16 BRPM | SYSTOLIC BLOOD PRESSURE: 132 MMHG | DIASTOLIC BLOOD PRESSURE: 75 MMHG | BODY MASS INDEX: 30.73 KG/M2 | TEMPERATURE: 96.6 F | WEIGHT: 180 LBS | HEIGHT: 64 IN

## 2024-05-29 DIAGNOSIS — C19 MALIGNANT NEOPLASM OF RECTOSIGMOID JUNCTION: ICD-10-CM

## 2024-05-29 DIAGNOSIS — K62.7 RADIATION PROCTITIS: ICD-10-CM

## 2024-05-29 PROCEDURE — 99024 POSTOP FOLLOW-UP VISIT: CPT

## 2024-05-30 PROBLEM — C19 RECTOSIGMOID CANCER: Status: ACTIVE | Noted: 2024-05-30

## 2024-05-30 PROBLEM — K62.7 RADIATION PROCTITIS: Status: ACTIVE | Noted: 2024-05-30

## 2024-05-30 NOTE — ASSESSMENT
[FreeTextEntry1] : Feels much better. He had called last week with complaint of weakness and there was concern for high ileostomy output.  He never went to ED.  Today he states he feels much better, believes that his blood sugar was low at the time, his symptoms resolved after he had some juice.  Reports that the ileostomy output is still liquid.  Has not tried Imodium.  Denies pain.  Denies rectal bleeding.    Comfortable. Abdomen soft, non-tender, non-distended. Port incisions and midline extraction wound healing well. No hernias. Ileostomy pink, functioning.  Doing well overall postop. I instructed him to try Imodium, 1 tablet 2 mg daily, may increase to twice a day as needed for high ileostomy output.  Stay well-hydrated with Gatorade type drinks. Discussed symptoms of dehydration due to high ileostomy output and instructed to go to the ED if he has such symptoms. All questions answered. RTO in 3 weeks.

## 2024-05-30 NOTE — HISTORY OF PRESENT ILLNESS
[FreeTextEntry1] : Luis Fernando is a 80 y/o male here for a post-op visit, s/p Laparoscopic low anterior resection, Ileostomy on 5/1/24. Pathology - 1. Rectosigmoid, low-anterior resection: - Adenocarcinoma, moderately differentiated of the rectosigmoid. - Negative for vascular or perineural invasion. - Nineteen lymph nodes, negative for carcinoma (0/19). - All margins are negative for dysplasia or malignancy. - See synoptic summary section for further details 2. Proximal and distal donut, resection: - Two colonic donuts negative for dysplasia or malignancy.  Last seen on 05/15/2- has incisional discomfort, pain meds control it. Comfortable.Abdomen soft, non-tender, non-distended. Port incisions and midline extraction wound healing well. Staples removed. No hernias. Ileostomy pink, functioning.Doing well postop. Path discussed. Instructions for diet, activity, wound care given, all questions answered. RTO in 2 weeks.

## 2024-06-26 ENCOUNTER — APPOINTMENT (OUTPATIENT)
Dept: SURGERY | Facility: CLINIC | Age: 79
End: 2024-06-26
Payer: MEDICAID

## 2024-06-26 VITALS
SYSTOLIC BLOOD PRESSURE: 158 MMHG | DIASTOLIC BLOOD PRESSURE: 90 MMHG | OXYGEN SATURATION: 98 % | TEMPERATURE: 97.3 F | HEART RATE: 76 BPM | RESPIRATION RATE: 17 BRPM

## 2024-06-26 DIAGNOSIS — Z09 ENCOUNTER FOR FOLLOW-UP EXAMINATION AFTER COMPLETED TREATMENT FOR CONDITIONS OTHER THAN MALIGNANT NEOPLASM: ICD-10-CM

## 2024-06-26 PROCEDURE — 99024 POSTOP FOLLOW-UP VISIT: CPT

## 2024-06-26 NOTE — HISTORY OF PRESENT ILLNESS
[FreeTextEntry1] : Luis Fernando is a 80 y/o male here for a post-op visit, s/p Laparoscopic low anterior resection, Ileostomy on 5/1/24. Pathology - 1. Rectosigmoid, low-anterior resection: - Adenocarcinoma, moderately differentiated of the rectosigmoid. - Negative for vascular or perineural invasion. - Nineteen lymph nodes, negative for carcinoma (0/19). - All margins are negative for dysplasia or malignancy. - See synoptic summary section for further details 2. Proximal and distal donut, resection: - Two colonic donuts negative for dysplasia or malignancy.  Last seen on 5/29/24 - Feels much better. He had called last week with complaint of weakness and there was concern for high ileostomy output. He never went to ED. Today he states he feels much better, believes that his blood sugar was low at the time, his symptoms resolved after he had some juice. Reports that the ileostomy output is still liquid. Has not tried Imodium. Denies pain. Denies rectal bleeding. Comfortable. Abdomen soft, non-tender, non-distended. Port incisions and midline extraction wound healing well. No hernias. Ileostomy pink, functioning. Doing well overall postop. I instructed him to try Imodium, 1 tablet 2 mg daily, may increase to twice a day as needed for high ileostomy output. Stay well-hydrated with Gatorade type drinks. Discussed symptoms of dehydration due to high ileostomy output and instructed to go to the ED if he has such symptoms. All questions answered. RTO in 3 weeks.  Today pt reports mild incision discomfort. Surgical incision is clean, dry, and intact. Denies nausea and vomiting. Denies fever and chills. Empties bag 4-5 times, pasty, formed stool sometimes comes from rectum, stoma is red and healthy. No longer taking imodium. Good appetite.

## 2024-06-26 NOTE — ASSESSMENT
[FreeTextEntry1] : Feels better overall. Getting stronger, walking more. Reports that the ileostomy output is still liquid intermittently.  Has not used Imodium recently.  Denies pain.  Denies rectal bleeding.    Has intermittent skin irritation around the ileostomy. Comfortable. Abdomen soft, non-tender, non-distended. Port incisions and midline extraction wound have healed well. No hernias. Ileostomy pink, functioning.  Doing well overall postop. I instructed him to use Imodium as needed for high ostomy output.  Stay well-hydrated with Gatorade type drinks. Discussed symptoms of dehydration due to high ileostomy output and instructed to go to the ED if he has such symptoms. All questions answered. RTO in 3 weeks.

## 2024-07-16 DIAGNOSIS — Z93.2 ILEOSTOMY STATUS: ICD-10-CM

## 2024-07-16 NOTE — HISTORY OF PRESENT ILLNESS
[FreeTextEntry1] : Luis Fernando is a 80 y/o male here for a post-op visit, s/p Laparoscopic low anterior resection, Ileostomy on 5/1/24. Pathology - 1. Rectosigmoid, low-anterior resection: - Adenocarcinoma, moderately differentiated of the rectosigmoid. - Negative for vascular or perineural invasion. - Nineteen lymph nodes, negative for carcinoma (0/19). - All margins are negative for dysplasia or malignancy. - See synoptic summary section for further details 2. Proximal and distal donut, resection: - Two colonic donuts negative for dysplasia or malignancy.  Last seen on 06/26/24- Feels better overall. Getting stronger, walking more. Reports that the ileostomy output is still liquid intermittently. Has not used Imodium recently. Denies pain. Denies rectal bleeding. Has intermittent skin irritation around the ileostomy. Comfortable. Abdomen soft, non-tender, non-distended. Port incisions and midline extraction wound have healed well. No hernias. Ileostomy pink, functioning. Doing well overall postop. I instructed him to use Imodium as needed for high ostomy output. Stay well-hydrated with Gatorade type drinks. Discussed symptoms of dehydration due to high ileostomy output and instructed to go to the ED if he has such symptoms. All questions answered. RTO in 3 weeks.

## 2024-07-19 ENCOUNTER — RESULT REVIEW (OUTPATIENT)
Age: 79
End: 2024-07-19

## 2024-07-19 ENCOUNTER — APPOINTMENT (OUTPATIENT)
Dept: SURGERY | Facility: HOSPITAL | Age: 79
End: 2024-07-19

## 2024-07-22 ENCOUNTER — TRANSCRIPTION ENCOUNTER (OUTPATIENT)
Age: 79
End: 2024-07-22

## 2024-07-23 ENCOUNTER — TRANSCRIPTION ENCOUNTER (OUTPATIENT)
Age: 79
End: 2024-07-23

## 2024-07-24 ENCOUNTER — APPOINTMENT (OUTPATIENT)
Dept: SURGERY | Facility: CLINIC | Age: 79
End: 2024-07-24

## 2024-07-29 NOTE — REASON FOR VISIT
"Additionally, patient requesting provider to resend prescription for venlafaxine to [facility] pharmacy. They have not been able to process refill due to sig stating \"to be filled by provider\" and not any other directions. Unable to contact [facility] due to provider line not working and extended wait times. Patient has only 7 days of medication remaining at this time.  " [Post Op: _________] : a [unfilled] post op visit

## 2024-07-31 ENCOUNTER — APPOINTMENT (OUTPATIENT)
Dept: SURGERY | Facility: CLINIC | Age: 79
End: 2024-07-31
Payer: MEDICAID

## 2024-07-31 VITALS
TEMPERATURE: 97.3 F | OXYGEN SATURATION: 98 % | RESPIRATION RATE: 17 BRPM | DIASTOLIC BLOOD PRESSURE: 72 MMHG | SYSTOLIC BLOOD PRESSURE: 146 MMHG

## 2024-07-31 DIAGNOSIS — S30.1XXA CONTUSION OF ABDOMINAL WALL, INITIAL ENCOUNTER: ICD-10-CM

## 2024-07-31 DIAGNOSIS — Z09 ENCOUNTER FOR FOLLOW-UP EXAMINATION AFTER COMPLETED TREATMENT FOR CONDITIONS OTHER THAN MALIGNANT NEOPLASM: ICD-10-CM

## 2024-07-31 PROCEDURE — 99024 POSTOP FOLLOW-UP VISIT: CPT

## 2024-07-31 RX ORDER — AMOXICILLIN AND CLAVULANATE POTASSIUM 875; 125 MG/1; MG/1
875-125 TABLET, COATED ORAL
Qty: 20 | Refills: 0 | Status: ACTIVE | COMMUNITY
Start: 2024-07-31 | End: 1900-01-01

## 2024-07-31 RX ORDER — AMOXICILLIN AND CLAVULANATE POTASSIUM 875; 125 MG/1; MG/1
875-125 TABLET, COATED ORAL
Qty: 14 | Refills: 0 | Status: DISCONTINUED | COMMUNITY
Start: 2024-07-31 | End: 2024-07-31

## 2024-07-31 NOTE — HISTORY OF PRESENT ILLNESS
[FreeTextEntry1] : Luis Fernando is a 78 y/o male here for a post-op visit, s/p ileostomy reversal 7/19/24.   s/p Laparoscopic low anterior resection, Ileostomy on 5/1/24. Pathology - 1. Rectosigmoid, low-anterior resection: - Adenocarcinoma, moderately differentiated of the rectosigmoid. - Negative for vascular or perineural invasion. - Nineteen lymph nodes, negative for carcinoma (0/19). - All margins are negative for dysplasia or malignancy. - See synoptic summary section for further details 2. Proximal and distal donut, resection: - Two colonic donuts negative for dysplasia or malignancy.  Last seen 6/26/24 - Feels better overall. Getting stronger, walking more. Reports that the ileostomy output is still liquid intermittently. Has not used Imodium recently. Denies pain. Denies rectal bleeding. Has intermittent skin irritation around the ileostomy. Comfortable. Abdomen soft, non-tender, non-distended. Port incisions and midline extraction wound have healed well. No hernias. Ileostomy pink, functioning. Doing well overall postop. I instructed him to use Imodium as needed for high ostomy output. Stay well-hydrated with Gatorade type drinks. Discussed symptoms of dehydration due to high ileostomy output and instructed to go to the ED if he has such symptoms. All questions answered. RTO in 3 weeks.  Today pt reports 5/10 pain - inconsistent. Has some drainage from surgical incision - dressing changed daily.. Denies nausea and vomiting. Denies fever and chills. Daily 4 times BM, loose. Good appetite, low fiber diet.

## 2024-07-31 NOTE — HISTORY OF PRESENT ILLNESS
[FreeTextEntry1] : Luis Fernando is a 80 y/o male here for a post-op visit, s/p ileostomy reversal 7/19/24.   s/p Laparoscopic low anterior resection, Ileostomy on 5/1/24. Pathology - 1. Rectosigmoid, low-anterior resection: - Adenocarcinoma, moderately differentiated of the rectosigmoid. - Negative for vascular or perineural invasion. - Nineteen lymph nodes, negative for carcinoma (0/19). - All margins are negative for dysplasia or malignancy. - See synoptic summary section for further details 2. Proximal and distal donut, resection: - Two colonic donuts negative for dysplasia or malignancy.  Last seen 6/26/24 - Feels better overall. Getting stronger, walking more. Reports that the ileostomy output is still liquid intermittently. Has not used Imodium recently. Denies pain. Denies rectal bleeding. Has intermittent skin irritation around the ileostomy. Comfortable. Abdomen soft, non-tender, non-distended. Port incisions and midline extraction wound have healed well. No hernias. Ileostomy pink, functioning. Doing well overall postop. I instructed him to use Imodium as needed for high ostomy output. Stay well-hydrated with Gatorade type drinks. Discussed symptoms of dehydration due to high ileostomy output and instructed to go to the ED if he has such symptoms. All questions answered. RTO in 3 weeks.  Today pt reports 5/10 pain - inconsistent. Has some drainage from surgical incision - dressing changed daily.. Denies nausea and vomiting. Denies fever and chills. Daily 4 times BM, loose. Good appetite, low fiber diet.

## 2024-08-08 ENCOUNTER — APPOINTMENT (OUTPATIENT)
Dept: SURGERY | Facility: CLINIC | Age: 79
End: 2024-08-08

## 2024-08-08 PROCEDURE — 99024 POSTOP FOLLOW-UP VISIT: CPT

## 2024-08-13 NOTE — ASSESSMENT
[FreeTextEntry1] : Telehealth visit today as he did not have a ride. Pt states he feels well. Minimal pain. Discharge from the old drain site has decreased, is minimal. Advised to complete the ABX. Transition to regular diet in 10 days.  RTO 8/28. 
yes...

## 2024-08-13 NOTE — HISTORY OF PRESENT ILLNESS
[FreeTextEntry1] : Luis Fernando is a 78 y/o male s/p ileostomy reversal 7/19/24.  This is a phone Telehealth apt.  Pathology: 1. Skin and small intestine, loop ileostomy, resection - Skin and small intestine with mild chronic inflammation consistent with ileostomy 2. Proximal and distal ileostomy, resection - Segments of small intestine with mild acute serositis, serosal adhesion and focal giant cell reaction  s/p Laparoscopic low anterior resection, Ileostomy on 5/1/24. Pathology - 1. Rectosigmoid, low-anterior resection: - Adenocarcinoma, moderately differentiated of the rectosigmoid. - Negative for vascular or perineural invasion. - Nineteen lymph nodes, negative for carcinoma (0/19). - All margins are negative for dysplasia or malignancy. - See synoptic summary section for further details 2. Proximal and distal donut, resection: - Two colonic donuts negative for dysplasia or malignancy.  Last seen 7/31/24   Today patient has some mild surgical incisional pain.  BMs loose due to ABX.   Good appetite.   Denies nausea, vomiting, fever or chills.  Surgical incision with gauze and tape, no redness, mild swelling and some drainage.

## 2024-08-13 NOTE — HISTORY OF PRESENT ILLNESS
[FreeTextEntry1] : Luis Fernando is a 80 y/o male s/p ileostomy reversal 7/19/24.  This is a phone Telehealth apt.  Pathology: 1. Skin and small intestine, loop ileostomy, resection - Skin and small intestine with mild chronic inflammation consistent with ileostomy 2. Proximal and distal ileostomy, resection - Segments of small intestine with mild acute serositis, serosal adhesion and focal giant cell reaction  s/p Laparoscopic low anterior resection, Ileostomy on 5/1/24. Pathology - 1. Rectosigmoid, low-anterior resection: - Adenocarcinoma, moderately differentiated of the rectosigmoid. - Negative for vascular or perineural invasion. - Nineteen lymph nodes, negative for carcinoma (0/19). - All margins are negative for dysplasia or malignancy. - See synoptic summary section for further details 2. Proximal and distal donut, resection: - Two colonic donuts negative for dysplasia or malignancy.  Last seen 7/31/24   Today patient has some mild surgical incisional pain.  BMs loose due to ABX.   Good appetite.   Denies nausea, vomiting, fever or chills.  Surgical incision with gauze and tape, no redness, mild swelling and some drainage.

## 2024-08-28 ENCOUNTER — APPOINTMENT (OUTPATIENT)
Dept: SURGERY | Facility: CLINIC | Age: 79
End: 2024-08-28
Payer: MEDICAID

## 2024-08-28 VITALS
HEART RATE: 62 BPM | RESPIRATION RATE: 17 BRPM | SYSTOLIC BLOOD PRESSURE: 156 MMHG | DIASTOLIC BLOOD PRESSURE: 73 MMHG | OXYGEN SATURATION: 98 % | TEMPERATURE: 97.1 F

## 2024-08-28 DIAGNOSIS — Z09 ENCOUNTER FOR FOLLOW-UP EXAMINATION AFTER COMPLETED TREATMENT FOR CONDITIONS OTHER THAN MALIGNANT NEOPLASM: ICD-10-CM

## 2024-08-28 DIAGNOSIS — K63.89 OTHER SPECIFIED DISEASES OF INTESTINE: ICD-10-CM

## 2024-08-28 DIAGNOSIS — Z85.048 PERSONAL HISTORY OF OTHER MALIGNANT NEOPLASM OF RECTUM, RECTOSIGMOID JUNCTION, AND ANUS: ICD-10-CM

## 2024-08-28 PROCEDURE — 99024 POSTOP FOLLOW-UP VISIT: CPT

## 2024-08-28 NOTE — ASSESSMENT
[FreeTextEntry1] : Doing well postop. Continue high fiber diet. OK to travel to [place] soon, states it'll be after 2-3 weeks. Last colonoscopy was in April 2024, I advised for a 1-year surveillance colonoscopy in April 2025, gave info for the GI clinic.  Advised for CT chest/abd/pelvis q6 months and CEA bloodwork q6 months for surveillance for stage II rectosigmoid Ca history, he will give my note to PMD either in  or [place].  He knows his insurance does not cover office visits with me beyond the 3 months postop.  RTO as needed.

## 2024-08-28 NOTE — ASSESSMENT
[FreeTextEntry1] : Doing well postop. Continue high fiber diet. OK to travel to [place] soon, states it'll be after 2-3 weeks. Last colonoscopy was in April 2024, I advised for a 1-year surveillance colonoscopy in April 2025, gave info for the GI clinic.  Advised for CT chest/abd/pelvis q6 months and CEA bloodwork q6 months for surveillance for stage II rectosigmoid Ca history, he will give my note to PMD either in  or [place].  He knows his insurance does not cover office visits with me beyond the 3 months postop.  RTO as needed.

## 2024-08-28 NOTE — HISTORY OF PRESENT ILLNESS
[FreeTextEntry1] : Luis Fernando is a 80 y/o male here for a post-op visit, s/p ileostomy reversal on 7/19/24.  Pathology: 1. Skin and small intestine, loop ileostomy, resection - Skin and small intestine with mild chronic inflammation consistent with ileostomy 2. Proximal and distal ileostomy, resection - Segments of small intestine with mild acute serositis, serosal adhesion and focal giant cell reaction  s/p Laparoscopic low anterior resection, diverting loop Ileostomy on 5/1/24, stage [id number]. Pathology - 1. Rectosigmoid, low-anterior resection: - Adenocarcinoma, moderately differentiated of the rectosigmoid. - Negative for vascular or perineural invasion. - Nineteen lymph nodes, negative for carcinoma (0/19). - All margins are negative for dysplasia or malignancy. - See synoptic summary section for further details 2. Proximal and distal donut, resection: - Two colonic donuts negative for dysplasia or malignancy.  Last seen on 8/8/24 - Telehealth visit today as he did not have a ride. Pt states he feels well. Minimal pain. Discharge from the old drain site has decreased, is minimal. Advised to complete the ABX. Transition to regular diet in 10 days. RTO 8/28.  Today pt reports no pain. Surgical incision is clean, dry, intact. Denies nausea and vomiting. Denies fever and chills. Daily on2-3 times, formed, denies constipation or diarrhea. Good appetite, normal diet.

## 2024-08-28 NOTE — PHYSICAL EXAM
[FreeTextEntry1] : Comfortable. Abdomen soft, with expected postop induration around the R lateral incision. No fluctuance or erythema. The incision is healing well and there are no open wounds. No hernias.

## 2024-08-28 NOTE — HISTORY OF PRESENT ILLNESS
[FreeTextEntry1] : Luis Fernando is a 78 y/o male here for a post-op visit, s/p ileostomy reversal on 7/19/24.  Pathology: 1. Skin and small intestine, loop ileostomy, resection - Skin and small intestine with mild chronic inflammation consistent with ileostomy 2. Proximal and distal ileostomy, resection - Segments of small intestine with mild acute serositis, serosal adhesion and focal giant cell reaction  s/p Laparoscopic low anterior resection, diverting loop Ileostomy on 5/1/24, stage [id number]. Pathology - 1. Rectosigmoid, low-anterior resection: - Adenocarcinoma, moderately differentiated of the rectosigmoid. - Negative for vascular or perineural invasion. - Nineteen lymph nodes, negative for carcinoma (0/19). - All margins are negative for dysplasia or malignancy. - See synoptic summary section for further details 2. Proximal and distal donut, resection: - Two colonic donuts negative for dysplasia or malignancy.  Last seen on 8/8/24 - Telehealth visit today as he did not have a ride. Pt states he feels well. Minimal pain. Discharge from the old drain site has decreased, is minimal. Advised to complete the ABX. Transition to regular diet in 10 days. RTO 8/28.  Today pt reports no pain. Surgical incision is clean, dry, intact. Denies nausea and vomiting. Denies fever and chills. Daily on2-3 times, formed, denies constipation or diarrhea. Good appetite, normal diet.

## 2024-10-07 NOTE — PROGRESS NOTE ADULT - PROBLEM SELECTOR PLAN 1
Detail Level: Detailed Detail Level: Simple CT with thickening in sigmoid colon. DDx: Radiation proctitis vs. colonic malignancy vs. ulcer vs. AVM's   - No prior colonoscopy as per discussion with patient  - NPO after midnight for tentative colonoscopy today  - Trend H&H and transfuse for goal hgb > 7 CT with thickening in sigmoid colon. DDx: colonic malignancy  - No prior colonoscopy as per discussion with patient  - colonoscopy today with partial obstructing mass in colon. Needs CT chest for staging  - Surgical oncology eval   - Trend H&H and transfuse for goal hgb > 7

## 2025-05-22 ENCOUNTER — EMERGENCY (EMERGENCY)
Facility: HOSPITAL | Age: 80
LOS: 1 days | End: 2025-05-22
Attending: STUDENT IN AN ORGANIZED HEALTH CARE EDUCATION/TRAINING PROGRAM
Payer: MEDICAID

## 2025-05-22 VITALS
RESPIRATION RATE: 16 BRPM | SYSTOLIC BLOOD PRESSURE: 210 MMHG | DIASTOLIC BLOOD PRESSURE: 99 MMHG | HEIGHT: 64 IN | HEART RATE: 70 BPM | TEMPERATURE: 99 F | OXYGEN SATURATION: 98 % | WEIGHT: 179.9 LBS

## 2025-05-22 VITALS
RESPIRATION RATE: 18 BRPM | HEART RATE: 60 BPM | OXYGEN SATURATION: 100 % | DIASTOLIC BLOOD PRESSURE: 80 MMHG | SYSTOLIC BLOOD PRESSURE: 180 MMHG

## 2025-05-22 LAB
ALBUMIN SERPL ELPH-MCNC: 4.4 G/DL — SIGNIFICANT CHANGE UP (ref 3.3–5)
ALP SERPL-CCNC: 76 U/L — SIGNIFICANT CHANGE UP (ref 40–120)
ALT FLD-CCNC: 16 U/L — SIGNIFICANT CHANGE UP (ref 10–45)
ANION GAP SERPL CALC-SCNC: 13 MMOL/L — SIGNIFICANT CHANGE UP (ref 5–17)
APTT BLD: 27.4 SEC — SIGNIFICANT CHANGE UP (ref 26.1–36.8)
AST SERPL-CCNC: 24 U/L — SIGNIFICANT CHANGE UP (ref 10–40)
BASOPHILS # BLD AUTO: 0.04 K/UL — SIGNIFICANT CHANGE UP (ref 0–0.2)
BASOPHILS NFR BLD AUTO: 0.4 % — SIGNIFICANT CHANGE UP (ref 0–2)
BILIRUB SERPL-MCNC: 0.5 MG/DL — SIGNIFICANT CHANGE UP (ref 0.2–1.2)
BUN SERPL-MCNC: 15 MG/DL — SIGNIFICANT CHANGE UP (ref 7–23)
CALCIUM SERPL-MCNC: 9.8 MG/DL — SIGNIFICANT CHANGE UP (ref 8.4–10.5)
CHLORIDE SERPL-SCNC: 104 MMOL/L — SIGNIFICANT CHANGE UP (ref 96–108)
CO2 SERPL-SCNC: 22 MMOL/L — SIGNIFICANT CHANGE UP (ref 22–31)
CREAT SERPL-MCNC: 0.87 MG/DL — SIGNIFICANT CHANGE UP (ref 0.5–1.3)
EGFR: 87 ML/MIN/1.73M2 — SIGNIFICANT CHANGE UP
EGFR: 87 ML/MIN/1.73M2 — SIGNIFICANT CHANGE UP
EOSINOPHIL # BLD AUTO: 0.12 K/UL — SIGNIFICANT CHANGE UP (ref 0–0.5)
EOSINOPHIL NFR BLD AUTO: 1.3 % — SIGNIFICANT CHANGE UP (ref 0–6)
GLUCOSE SERPL-MCNC: 117 MG/DL — HIGH (ref 70–99)
HCT VFR BLD CALC: 38.3 % — LOW (ref 39–50)
HGB BLD-MCNC: 11.9 G/DL — LOW (ref 13–17)
IMM GRANULOCYTES NFR BLD AUTO: 0.4 % — SIGNIFICANT CHANGE UP (ref 0–0.9)
INR BLD: 0.96 RATIO — SIGNIFICANT CHANGE UP (ref 0.85–1.16)
LIDOCAIN IGE QN: 52 U/L — SIGNIFICANT CHANGE UP (ref 7–60)
LYMPHOCYTES # BLD AUTO: 1.45 K/UL — SIGNIFICANT CHANGE UP (ref 1–3.3)
LYMPHOCYTES # BLD AUTO: 16.2 % — SIGNIFICANT CHANGE UP (ref 13–44)
MCHC RBC-ENTMCNC: 27.2 PG — SIGNIFICANT CHANGE UP (ref 27–34)
MCHC RBC-ENTMCNC: 31.1 G/DL — LOW (ref 32–36)
MCV RBC AUTO: 87.4 FL — SIGNIFICANT CHANGE UP (ref 80–100)
MONOCYTES # BLD AUTO: 0.85 K/UL — SIGNIFICANT CHANGE UP (ref 0–0.9)
MONOCYTES NFR BLD AUTO: 9.5 % — SIGNIFICANT CHANGE UP (ref 2–14)
NEUTROPHILS # BLD AUTO: 6.43 K/UL — SIGNIFICANT CHANGE UP (ref 1.8–7.4)
NEUTROPHILS NFR BLD AUTO: 72.2 % — SIGNIFICANT CHANGE UP (ref 43–77)
NRBC BLD AUTO-RTO: 0 /100 WBCS — SIGNIFICANT CHANGE UP (ref 0–0)
PLATELET # BLD AUTO: 237 K/UL — SIGNIFICANT CHANGE UP (ref 150–400)
POTASSIUM SERPL-MCNC: 5 MMOL/L — SIGNIFICANT CHANGE UP (ref 3.5–5.3)
POTASSIUM SERPL-SCNC: 5 MMOL/L — SIGNIFICANT CHANGE UP (ref 3.5–5.3)
PROT SERPL-MCNC: 7.7 G/DL — SIGNIFICANT CHANGE UP (ref 6–8.3)
PROTHROM AB SERPL-ACNC: 11 SEC — SIGNIFICANT CHANGE UP (ref 9.9–13.4)
RBC # BLD: 4.38 M/UL — SIGNIFICANT CHANGE UP (ref 4.2–5.8)
RBC # FLD: 13.8 % — SIGNIFICANT CHANGE UP (ref 10.3–14.5)
SODIUM SERPL-SCNC: 139 MMOL/L — SIGNIFICANT CHANGE UP (ref 135–145)
WBC # BLD: 8.93 K/UL — SIGNIFICANT CHANGE UP (ref 3.8–10.5)
WBC # FLD AUTO: 8.93 K/UL — SIGNIFICANT CHANGE UP (ref 3.8–10.5)

## 2025-05-22 PROCEDURE — 86901 BLOOD TYPING SEROLOGIC RH(D): CPT

## 2025-05-22 PROCEDURE — 80053 COMPREHEN METABOLIC PANEL: CPT

## 2025-05-22 PROCEDURE — 86900 BLOOD TYPING SEROLOGIC ABO: CPT

## 2025-05-22 PROCEDURE — 99285 EMERGENCY DEPT VISIT HI MDM: CPT

## 2025-05-22 PROCEDURE — 74177 CT ABD & PELVIS W/CONTRAST: CPT | Mod: 26

## 2025-05-22 PROCEDURE — 85610 PROTHROMBIN TIME: CPT

## 2025-05-22 PROCEDURE — 74177 CT ABD & PELVIS W/CONTRAST: CPT

## 2025-05-22 PROCEDURE — 96374 THER/PROPH/DIAG INJ IV PUSH: CPT | Mod: XU

## 2025-05-22 PROCEDURE — 85025 COMPLETE CBC W/AUTO DIFF WBC: CPT

## 2025-05-22 PROCEDURE — 85730 THROMBOPLASTIN TIME PARTIAL: CPT

## 2025-05-22 PROCEDURE — 83690 ASSAY OF LIPASE: CPT

## 2025-05-22 PROCEDURE — 86850 RBC ANTIBODY SCREEN: CPT

## 2025-05-22 PROCEDURE — 99284 EMERGENCY DEPT VISIT MOD MDM: CPT | Mod: 25

## 2025-05-22 RX ORDER — ACETAMINOPHEN 500 MG/5ML
1000 LIQUID (ML) ORAL ONCE
Refills: 0 | Status: COMPLETED | OUTPATIENT
Start: 2025-05-22 | End: 2025-05-22

## 2025-05-22 RX ADMIN — Medication 400 MILLIGRAM(S): at 11:22
